# Patient Record
Sex: FEMALE | Race: OTHER | Employment: FULL TIME | URBAN - METROPOLITAN AREA
[De-identification: names, ages, dates, MRNs, and addresses within clinical notes are randomized per-mention and may not be internally consistent; named-entity substitution may affect disease eponyms.]

---

## 2022-05-12 ENCOUNTER — TRANSCRIBE ORDER (OUTPATIENT)
Dept: SCHEDULING | Age: 27
End: 2022-05-12

## 2022-05-12 DIAGNOSIS — Z98.890 OTHER SPECIFIED POSTPROCEDURAL STATES: Primary | ICD-10-CM

## 2022-06-08 ENCOUNTER — HOSPITAL ENCOUNTER (OUTPATIENT)
Dept: NON INVASIVE DIAGNOSTICS | Age: 27
Discharge: HOME | End: 2022-06-08
Attending: STUDENT IN AN ORGANIZED HEALTH CARE EDUCATION/TRAINING PROGRAM
Payer: MEDICAID

## 2022-06-08 VITALS
SYSTOLIC BLOOD PRESSURE: 101 MMHG | BODY MASS INDEX: 28.34 KG/M2 | DIASTOLIC BLOOD PRESSURE: 54 MMHG | HEIGHT: 62 IN | WEIGHT: 154 LBS

## 2022-06-08 DIAGNOSIS — Z98.890 OTHER SPECIFIED POSTPROCEDURAL STATES: ICD-10-CM

## 2022-06-08 LAB
ECHO AO ROOT DIAM: 2.6 CM
ECHO AO ROOT INDEX: 1.52 CM/M2
ECHO AV MEAN GRADIENT: 4 MMHG
ECHO AV MEAN VELOCITY: 0.9 M/S
ECHO AV PEAK GRADIENT: 8 MMHG
ECHO AV PEAK VELOCITY: 1.4 M/S
ECHO AV VTI: 25.3 CM
ECHO LA DIAMETER INDEX: 1.4 CM/M2
ECHO LA DIAMETER: 2.4 CM
ECHO LA TO AORTIC ROOT RATIO: 0.92
ECHO LA VOL 4C: 20 ML (ref 22–52)
ECHO LA VOLUME INDEX A4C: 12 ML/M2 (ref 16–34)
ECHO LV E' LATERAL VELOCITY: 23 CM/S
ECHO LV E' SEPTAL VELOCITY: 12 CM/S
ECHO LV EDV A4C: 90 ML
ECHO LV EDV INDEX A4C: 53 ML/M2
ECHO LV EJECTION FRACTION A4C: 58 %
ECHO LV ESV A4C: 38 ML
ECHO LV ESV INDEX A4C: 22 ML/M2
ECHO LV FRACTIONAL SHORTENING: 30 % (ref 28–44)
ECHO LV GLOBAL LONGITUDINAL STRAIN (GLS): -19.4 %
ECHO LV INTERNAL DIMENSION DIASTOLE INDEX: 2.57 CM/M2
ECHO LV INTERNAL DIMENSION DIASTOLIC: 4.4 CM (ref 3.9–5.3)
ECHO LV INTERNAL DIMENSION SYSTOLIC INDEX: 1.81 CM/M2
ECHO LV INTERNAL DIMENSION SYSTOLIC: 3.1 CM
ECHO LV IVSD: 0.9 CM (ref 0.6–0.9)
ECHO LV MASS 2D: 118.6 G (ref 67–162)
ECHO LV MASS INDEX 2D: 69.3 G/M2 (ref 43–95)
ECHO LV POSTERIOR WALL DIASTOLIC: 0.8 CM (ref 0.6–0.9)
ECHO LV RELATIVE WALL THICKNESS RATIO: 0.36
ECHO LVOT AREA: 3.1 CM2
ECHO LVOT DIAM: 2 CM
ECHO MV A VELOCITY: 0.43 M/S
ECHO MV E DECELERATION TIME (DT): 162.7 MS
ECHO MV E VELOCITY: 0.99 M/S
ECHO MV E/A RATIO: 2.3
ECHO MV E/E' LATERAL: 4.3
ECHO MV E/E' RATIO (AVERAGED): 6.28
ECHO MV E/E' SEPTAL: 8.25
ECHO PULMONARY ARTERY END DIASTOLIC PRESSURE: 5 MMHG
ECHO PV REGURGITANT MAX VELOCITY: 1.1 M/S
ECHO RV FREE WALL PEAK S': 20 CM/S
ECHO RV INTERNAL DIMENSION: 2.7 CM
ECHO RV TAPSE: 2.1 CM (ref 1.7–?)

## 2022-06-08 PROCEDURE — 96374 THER/PROPH/DIAG INJ IV PUSH: CPT

## 2022-06-24 ENCOUNTER — HOSPITAL ENCOUNTER (EMERGENCY)
Age: 27
Discharge: HOME | End: 2022-06-24
Attending: OBSTETRICS & GYNECOLOGY | Admitting: OBSTETRICS & GYNECOLOGY
Payer: MEDICAID

## 2022-06-24 VITALS
HEART RATE: 74 BPM | RESPIRATION RATE: 16 BRPM | BODY MASS INDEX: 40.48 KG/M2 | DIASTOLIC BLOOD PRESSURE: 61 MMHG | WEIGHT: 220 LBS | TEMPERATURE: 97.9 F | SYSTOLIC BLOOD PRESSURE: 104 MMHG | HEIGHT: 62 IN

## 2022-06-24 LAB
APPEARANCE UR: CLEAR
BILIRUB UR QL: NEGATIVE
COLOR UR: YELLOW
GLUCOSE UR QL STRIP.AUTO: NEGATIVE MG/DL
KETONES UR-MCNC: ABNORMAL MG/DL
LEUKOCYTE ESTERASE UR QL STRIP: NEGATIVE
NITRITE UR QL: NEGATIVE
PH UR: 6.5 [PH] (ref 5–9)
PROT UR QL: NEGATIVE MG/DL
RBC # UR STRIP: NEGATIVE /UL
SERVICE CMNT-IMP: ABNORMAL
SP GR UR: 1.02 (ref 1–1.02)
UROBILINOGEN UR QL: 0.2 EU/DL (ref 0.2–1)

## 2022-06-24 PROCEDURE — 74011250636 HC RX REV CODE- 250/636: Performed by: MIDWIFE

## 2022-06-24 PROCEDURE — 59025 FETAL NON-STRESS TEST: CPT

## 2022-06-24 PROCEDURE — 96360 HYDRATION IV INFUSION INIT: CPT

## 2022-06-24 PROCEDURE — 81003 URINALYSIS AUTO W/O SCOPE: CPT

## 2022-06-24 PROCEDURE — 99284 EMERGENCY DEPT VISIT MOD MDM: CPT

## 2022-06-24 RX ORDER — FAMOTIDINE 20 MG/1
20 TABLET, FILM COATED ORAL 2 TIMES DAILY
COMMUNITY
End: 2022-09-03

## 2022-06-24 RX ORDER — SERTRALINE HYDROCHLORIDE 25 MG/1
TABLET, FILM COATED ORAL DAILY
COMMUNITY

## 2022-06-24 RX ADMIN — SODIUM CHLORIDE, SODIUM LACTATE, POTASSIUM CHLORIDE, AND CALCIUM CHLORIDE 1000 ML: 600; 310; 30; 20 INJECTION, SOLUTION INTRAVENOUS at 23:30

## 2022-06-24 NOTE — LETTER
6/24/2022 11:35 PM    Ms. Pedroza Ar [address] [address]     To Whom it May Concern:     Ms. Divya Jose was seen today in the L&D triage at [facility].. She is being discharged with no restrictions. If there are any questions, please feel free to call. Sincerely,        Scott Albarado.  MAX Smith

## 2022-06-25 NOTE — PROGRESS NOTES
HPI:    Subjective: here for c/o upper abdominal pain - lasting approx 3 min. Once per hour, decreased FM, light pink in mucus when wiped yesterday - none today, denies reg. Ctx or loss of fluid, denies vaginal pruritus or dysuria     Fabian Patterson, a 32 y.o.   at 30w6d presents to L&D with c/o see above. Assessment:  Past Medical History:   Diagnosis Date    Atrial septal defect     Major depressive disorder     Narcolepsy     Narcotic abuse in remission [facility])      Past Surgical History:   Procedure Laterality Date    HX TYMPANOSTOMY      HX VENTRAL SEPTAL DEFECT REPAIR         Allergies   Allergen Reactions    Papaya Hives    Rocephin [Ceftriaxone] Hives     Prior to Admission medications    Medication Sig Start Date End Date Taking? Authorizing Provider   PNV Comb #2-Iron-FA-Omega 3 29-1-400 mg cmpk Take  by mouth. Yes Provider, Historical   Iron BisGl &PS Hwn-[id number] mg-mg-mcg-mg cap Take  by mouth. Yes Provider, Historical   sertraline (Zoloft) 25 mg tablet Take  by mouth daily. Yes Provider, Historical   famotidine (Pepcid) 20 mg tablet Take 20 mg by mouth two (2) times a day. Yes Provider, Historical        Objective:     Vitals:  Vitals:    22 2211 22 2212 22 2230   BP:   104/61   Pulse:   74   Resp:   16   Temp:  97.9 °F (36.6 °C)    Weight: 99.8 kg (220 lb)     Height: 5' 2\" (1.575 m)          Physical Exam:  Patient without distress. Abdomen: soft, nontender  Fundus: soft and non tender  Perineum: blood absent, amniotic fluid absent  Membranes:  Intact  Fetal Heart Rate: Reactive  Baseline: 120 per minute  Variability: moderate  Accelerations: yes  Decelerations: variable - intermittent  Uterine contractions: none  Uterine irritability: no  Cervical exam: Deferred  U/A: Urine dipstick shows positive for trace ketones.  S.G. 1.025    Assessment/Plan: reassuring fetal status, dehydration - resolved with IVF bolus, no e/o PTL, patient reports feeling fetal movement prior to discharge     Plan: DC home with standard & ER labor precautions. Follow-up in office for routine visit.      Signed By:  Marlin Salas CNM     June 24, 2022

## 2022-06-25 NOTE — PROGRESS NOTES
2215Magddm Cid presents to L&D triage reporting upper abdominal pain/cramping lasting about 3 minutes, once per hour today. 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 Kristie Lilly also reports one episode of pink-streaked mucus discharge yesterday evening, some mucus discharge since then. No LOF or vaginal bleeding noted. Reports decreased fetal movement today, discussed changes in characteristics of movement as fetus grows. Now feeling movement. 2230: Per ARTI Arshad, give 1L IV fluid bolus at this time. 2330: Verena reports feeling better at this time. Per ARTI Arshad, ok to d/c home. Given note for work that she is being d/c with no restrictions. Discharge instructions given, instructed to increase fluid intake and rest as needed. F/U with OB.

## 2022-07-01 ENCOUNTER — HOSPITAL ENCOUNTER (OUTPATIENT)
Dept: PHYSICAL THERAPY | Age: 27
Discharge: HOME | End: 2022-07-01
Payer: MEDICAID

## 2022-07-01 PROCEDURE — 97530 THERAPEUTIC ACTIVITIES: CPT

## 2022-07-01 PROCEDURE — 97110 THERAPEUTIC EXERCISES: CPT

## 2022-07-01 PROCEDURE — 97112 NEUROMUSCULAR REEDUCATION: CPT

## 2022-07-01 NOTE — PROGRESS NOTES
PF DAILY TREATMENT NOTE    Patient Name: Lynn Garvey  Date:2022  : 1995  [x]  Patient  Verified  Payor: [facility] MEDICAID / Plan: [address] [facility] / Product Type: Managed Care Medicaid /    In time:9:36  Out time:10:16  Total Treatment Time (min): 40    Visit #: 2 of 8    Treatment Area: Other symptoms and signs involving the genitourinary system [R39.89]    SUBJECTIVE  Pain Level (0-10 scale): 0/10  Any medication changes, allergies to medications, adverse drug reactions, diagnosis change, or new procedure performed?: [x] No    [] Yes (see summary sheet for update)  Subjective functional status/changes:   [x] No changes reported  Patient states that she has been working on her kegels. She had some pain with her rotation exercises but thinks it is due to baby placement. OBJECTIVE    15 min Therapeutic Exercise:  [x] See flow sheet :   [x]  Pelvic floor strengthening                 []  Pelvic floor downtraining  []  Quality pelvic floor contractions       []  Relaxation techniques  []  Urge suppression exercises  [x]  Other: gluteal, adductor, and postural strengthening   Rationale: increase ROM, increase strength, improve coordination, improve balance and increase proprioception  to improve the patients ability to return to PLOF. 10 min Therapeutic Activity:  [x]  See flow sheet :    []  Increase Tissue extensibility        []  Assess fiber intake    []  Assess voiding habits  []  Assess bowel habits  []  Other:   Rationale: increase ROM, increase strength, improve coordination, improve balance and increase proprioception  to improve the patients ability to reduce pain with transitional movements.        15 min Neuromuscular Re-education:  [x]  See flow sheet :   [x]  Pelvic floor strengthening                 []  Pelvic floor downtraining  [x]  Quality pelvic floor contractions       []  Relaxation techniques  []  Urge suppression exercises  [x]  Other: abdominal activation  Rationale: increase ROM, increase strength, improve coordination, improve balance and increase proprioception  to improve the patients ability to activate abdominals with functional ADLs. With   [x] TE   [x] TA   [] neuro  [] manual  [] self care   [] other: Patient Education: [x] Review HEP    [] Progressed/Changed HEP based on:   [] positioning   [] body mechanics   [x] transfers   [] heat/ice application    [] other:      Pain Level (0-10 scale) post treatment: 2/10    ASSESSMENT/Changes in Function: Patient did well with today's session. She tolerated addition of TrA march, sidelying clams, hip adduction, and cat/cow for abdominal and pelvic stability. Patient does demonstrate abdominal pain with transitional movements. Reviewed use of transverse abdominis activation with exhale during transitional movements to reduce pain. Patient will continue to benefit from skilled PT services to modify and progress therapeutic interventions, address functional mobility deficits, address ROM deficits, address strength deficits, analyze and address soft tissue restrictions, analyze and cue movement patterns, analyze and modify body mechanics/ergonomics, assess and modify postural abnormalities, address imbalance/dizziness and instruct in home and community integration to attain remaining goals. [x]  See Plan of Care  []  See progress note/recertification  []  See Discharge Summary         Progress towards goals / Updated goals:  Short term goals: To be achieved in 4 weeks:  Patient will demonstrate proper dietary/fluid habits and urge suppression strategies that promote bladder and bowel health to aid in management of urinary urgency and incontinence. Status at eval: Patient consuming insufficient amount of water      Patient will report improvement in UI complaints evidence by a decrease in pad usage and/or amount of leakage by 50% to improve QOL.   Status at eval: Patient using 1-2 pads (pantiliners) per day, generally drop or two (amount of leakage).      Patient will report being able to stand for 2 hours with no more than 4/10 pain  Status at eval:  6/10 pain with standing for more than 2 hours  /Current: 3-4/10 pain after standing for more than 2 hours 7/1/22     Long term goals: To be achieved in 8 weeks:  Patient will report bladder continence 75% of the time with cough/sneeze/laugh and walking to the toilet.   Status at eval: patient stress and urgency incontinence 1 times  per week     Patient will report being able to stand for 2 hours with no more than 2/10 pain  Status at eval:  6/10 pain with standing for more than 2 hours     Patient will demonstrate improvement of current complaints evidenced by reporting \"little difficulty' with household chores due to her pain  Status at Eval: Patient reported \"moderate difficulty\" with household chores     Patient will demonstrate independence with management tools and exercise program that are beneficial for current condition in order to feel comfortable with Pelvic floor PT D/C and not fear exacerbation of current condition or symptoms returning.    Status at eval : patient is unaware of what exercises to do to lessen her symptoms and unaware of what activities to avoid to avoid exacerbation of current condition       PLAN  []  Upgrade activities as tolerated     [x]  Continue plan of care  []  Update interventions per flow sheet       []  Discharge due to:_  []  Other:_      Alli Proctor PT 7/1/2022  8:18 AM    Future Appointments   Date Time Provider Chika Funez   7/1/2022  9:30 AM Tj Combs [facility] [address]   7/6/2022  2:45 PM Tj Combs [facility]   7/13/2022  1:15 PM Perez Daly [facility]   7/20/2022  1:15 PM Perez Daly, [facility]   7/27/2022  2:00 PM Perez Daly PT [facility]

## 2022-07-06 ENCOUNTER — HOSPITAL ENCOUNTER (OUTPATIENT)
Dept: PHYSICAL THERAPY | Age: 27
Discharge: HOME | End: 2022-07-06
Payer: MEDICAID

## 2022-07-06 PROCEDURE — 97530 THERAPEUTIC ACTIVITIES: CPT

## 2022-07-06 PROCEDURE — 97112 NEUROMUSCULAR REEDUCATION: CPT

## 2022-07-06 PROCEDURE — 97110 THERAPEUTIC EXERCISES: CPT

## 2022-07-06 NOTE — PROGRESS NOTES
PF DAILY TREATMENT NOTE    Patient Name: Octavia Ayala  Date:2022  : 1995  [x]  Patient  Verified  Payor: [facility] MEDICAID / Plan: [facility] / Product Type: Managed Care Medicaid /    In time:2:50  Out time:3:28  Total Treatment Time (min): 38    For [facility]/[employer] only  Total Timed Codes (min): 38  Of Timed Code minutes, 1:1 Treatment Time: 38     Visit #: 3 of 8    Treatment Area: Other symptoms and signs involving the genitourinary system [R39.89]    SUBJECTIVE  Pain Level (0-10 scale): 0/10  Any medication changes, allergies to medications, adverse drug reactions, diagnosis change, or new procedure performed?: [x] No    [] Yes (see summary sheet for update)  Subjective functional status/changes:   [x] No changes reported  Patient states she has figured out a routine for her exercises. She states that pelvic pain and pressure has been improving. OBJECTIVE    20 min Therapeutic Exercise:  [x] See flow sheet :   [x]  Pelvic floor strengthening                 []  Pelvic floor downtraining  [x]  Quality pelvic floor contractions       []  Relaxation techniques  []  Urge suppression exercises  []  Other:  Rationale: increase ROM, increase strength, improve coordination, improve balance and increase proprioception  to improve the patients ability to restore PLOF. 8 min Therapeutic Activity:  [x]  See flow sheet :    []  Increase Tissue extensibility        []  Assess fiber intake    []  Assess voiding habits  []  Assess bowel habits  []  Other:   Rationale: increase ROM, increase strength, improve coordination, improve balance and increase proprioception  to improve the patients ability to complete functional activities.      10 min Neuromuscular Re-education:  [x]  See flow sheet :   [x]  Pelvic floor strengthening                 []  Pelvic floor downtraining  [x]  Quality pelvic floor contractions       []  Relaxation techniques  []  Urge suppression exercises  [] Other:  Rationale: increase ROM, increase strength, improve coordination, improve balance and increase proprioception  to improve the patients ability to activate abdominals and pelvic floor without compensation to return PLOF. With   [] TE   [] TA   [] neuro  [] manual  [] self care   [] other: Patient Education: [x] Review HEP    [] Progressed/Changed HEP based on:   [] positioning   [] body mechanics   [] transfers   [] heat/ice application    [] other:      Other Objective/Functional Measures: see goals     Pain Level (0-10 scale) post treatment: 0/10    ASSESSMENT/Changes in Function: Patient tolerated treatment session well. Patient given updated HEP with TA and glute exercises. Patient exhibited improved mechanics with exercises resulting in less pelvic and abdominal pain. Patient demonstrated competency with log rolling as well. [x]  Decrease # of leaks   [x] No change []  Improving [] Resolved     [x]  Decrease pain [] No change [x]  Improving [] Resolved       Patient will continue to benefit from skilled PT services to modify and progress therapeutic interventions, address functional mobility deficits, address ROM deficits, address strength deficits, analyze and address soft tissue restrictions, analyze and cue movement patterns, analyze and modify body mechanics/ergonomics, assess and modify postural abnormalities, address imbalance/dizziness and instruct in home and community integration to attain remaining goals. [x]  See Plan of Care  []  See progress note/recertification  []  See Discharge Summary         Progress towards goals / Updated goals:  Short term goals: To be achieved in 4 weeks:  Patient will demonstrate proper dietary/fluid habits and urge suppression strategies that promote bladder and bowel health to aid in management of urinary urgency and incontinence.   Status at eval: Patient consuming insufficient amount of water      Patient will report improvement in UI complaints evidence by a decrease in pad usage and/or amount of leakage by 50% to improve QOL. Status at eval: Patient using 1-2 pads (pantiliners) per day, generally drop or two (amount of leakage).      Patient will report being able to stand for 2 hours with no more than 4/10 pain  Status at eval:  6/10 pain with standing for more than 2 hours  Current: 3-4/10 pain after standing for more than 2 hours 7/1/22     Long term goals: To be achieved in 8 weeks:  Patient will report bladder continence 75% of the time with cough/sneeze/laugh and walking to the toilet.   Status at eval: patient stress and urgency incontinence 1 times per week     Patient Mattie Grumbling being able to stand for 2 hours with no more than 2/10 pain  Status at eval:  6/10 pain with standing for more than 2 hours  Current: 3/10 pain with standing for more than 2 hours; progressing 7/6/22     Patient will demonstrate improvement of current complaints evidenced by reporting \"little difficulty' with household chores due to her pain  Status at Eval: Patient reported \"moderate difficulty\" with household chores     Patient will demonstrate independence with management tools and exercise program that are beneficial for current condition in order to feel comfortable with Pelvic floor PT D/C and not fear exacerbation of current condition or symptoms returning.    Status at eval : patient is unaware of what exercises to do to lessen her symptoms and unaware of what activities to avoid to avoid exacerbation of current condition    PLAN  []  Upgrade activities as tolerated     [x]  Continue plan of care  []  Update interventions per flow sheet       []  Discharge due to:_  []  Other:_      Jojo Turner, PT 7/6/2022  8:25 AM    Future Appointments   Date Time Provider Chika Funez   7/6/2022  2:45 PM Selam Martinez, [address] [facility]   7/13/2022  1:15 PM Ranjeet Rincon [address] [facility]   7/20/2022  1:15 PM Ranjeet Rincon [address] [facility]   7/27/2022  2:00 PM Ranjeet Rincon, PT [facility]

## 2022-07-07 ENCOUNTER — HOSPITAL ENCOUNTER (OUTPATIENT)
Age: 27
Discharge: HOME | End: 2022-07-07
Attending: STUDENT IN AN ORGANIZED HEALTH CARE EDUCATION/TRAINING PROGRAM | Admitting: STUDENT IN AN ORGANIZED HEALTH CARE EDUCATION/TRAINING PROGRAM
Payer: MEDICAID

## 2022-07-07 VITALS
HEART RATE: 79 BPM | WEIGHT: 225 LBS | SYSTOLIC BLOOD PRESSURE: 89 MMHG | DIASTOLIC BLOOD PRESSURE: 47 MMHG | RESPIRATION RATE: 18 BRPM | BODY MASS INDEX: 41.41 KG/M2 | TEMPERATURE: 98.1 F | HEIGHT: 62 IN

## 2022-07-07 PROBLEM — O26.899 VAGINAL DISCHARGE DURING PREGNANCY: Status: ACTIVE | Noted: 2022-07-07

## 2022-07-07 PROBLEM — Z33.1 IUP (INTRAUTERINE PREGNANCY), INCIDENTAL: Status: ACTIVE | Noted: 2022-07-07

## 2022-07-07 PROBLEM — N89.8 VAGINAL DISCHARGE DURING PREGNANCY: Status: ACTIVE | Noted: 2022-07-07

## 2022-07-07 PROBLEM — Z3A.32 32 WEEKS GESTATION OF PREGNANCY: Status: ACTIVE | Noted: 2022-07-07

## 2022-07-07 LAB
APPEARANCE UR: CLEAR
BILIRUB UR QL: NEGATIVE
COLOR UR: YELLOW
GLUCOSE UR STRIP.AUTO-MCNC: NEGATIVE MG/DL
HGB UR QL STRIP: NEGATIVE
KETONES UR QL STRIP.AUTO: NEGATIVE MG/DL
LEUKOCYTE ESTERASE UR QL STRIP.AUTO: NEGATIVE
NITRITE UR QL STRIP.AUTO: NEGATIVE
PH UR STRIP: 6.5 [PH] (ref 5–8)
PROT UR STRIP-MCNC: NEGATIVE MG/DL
SP GR UR REFRACTOMETRY: 1.01 (ref 1–1.03)
UROBILINOGEN UR QL STRIP.AUTO: 0.2 EU/DL (ref 0.2–1)

## 2022-07-07 PROCEDURE — 87491 CHLMYD TRACH DNA AMP PROBE: CPT

## 2022-07-07 PROCEDURE — 99284 EMERGENCY DEPT VISIT MOD MDM: CPT

## 2022-07-07 PROCEDURE — 87661 TRICHOMONAS VAGINALIS AMPLIF: CPT

## 2022-07-07 PROCEDURE — 59025 FETAL NON-STRESS TEST: CPT

## 2022-07-07 PROCEDURE — 81003 URINALYSIS AUTO W/O SCOPE: CPT

## 2022-07-08 LAB
C TRACH RRNA SPEC QL NAA+PROBE: NEGATIVE
N GONORRHOEA RRNA SPEC QL NAA+PROBE: NEGATIVE
SPECIMEN SOURCE: NORMAL

## 2022-07-08 NOTE — PROGRESS NOTES
Patient is a  at 32.5 weeks who presents to unit for concerns of abnormal discharge noted this afternoon when using the restroom. Patient denies s/sx of UTI.      Dr. Miky Moody on unit. MD made aware of patient and concerns. EFM reviewed. Patient PO hydrating in order to void. Orders received.  Dr. Miky Moody reviewed  [facility]. Discharge orders received.  discharge instructions reviewed. Patient denies questions or concerns.  Patient ambulates of unit at this time.

## 2022-07-08 NOTE — DISCHARGE INSTRUCTIONS
Patient Education   Patient Education   Patient Education        Weeks 32 to 29 of Your Pregnancy: Care Instructions  Overview     During the last few weeks of your pregnancy, you may have more aches and pains. It's important to rest when you can. Your growing baby is putting more pressure on your bladder. So you may need to urinate more often. Hemorrhoids are also common. These are painful, itchy veins in the rectal area. You may want to talk with your doctor about banking your baby's umbilical cord blood. This is the blood left in the cord after birth. If you want to save this blood, you must arrange it ahead of time. You can't decide at the last minute. If you haven't already had the Tdap shot during this pregnancy, talk to your doctor about getting it. It will help protect your  against pertussis infection. Follow-up care is a key part of your treatment and safety. Be sure to make and go to all appointments, and call your doctor if you are having problems. It's also a good idea to know your test results and keep a list of the medicines you take. How can you care for yourself at home? Ease hemorrhoids  · Get more liquids, fruits, vegetables, and fiber in your diet. This will help keep your stools soft. · Avoid sitting for too long. Lie on your left side several times a day. · Clean yourself with soft, moist toilet paper. Or you can use witch hazel pads or personal hygiene pads. · If you are uncomfortable, try ice packs. Or you can sit in a warm sitz bath. Do these for 20 minutes at a time, as needed. · Use hydrocortisone cream for pain and itching. Two examples are Anusol and Preparation H Hydrocortisone. · Ask your doctor about taking an over-the-counter stool softener. Consider breastfeeding  · Experts recommend breastfeeding for 1 year or longer. · Breast milk may help protect your child from some health problems.   babies are less likely than formula-fed babies to:  ? Get ear infections, colds, diarrhea, and pneumonia. ? Be obese or get diabetes later in life. · Breastfeeding causes the release of a hormone called oxytocin. This hormone may help your uterus shrink back faster. · Breastfeeding may help you lose weight faster. Making milk burns calories. · Breastfeeding can lower your risk of breast cancer, ovarian cancer, and osteoporosis. Decide about circumcision for your baby  · As you make this decision, it may help to think about your personal, [employer]ism, and family traditions. You get to decide if you will keep your baby's penis natural or if your baby will be circumcised. · If you decide that you would like to have your baby circumcised, talk with your doctor. You can share your concerns about pain. And you can discuss your preferences for anesthesia. Where can you learn more? Go to http://www.handley.com/  Enter X711 in the search box to learn more about \"Weeks 32 to 34 of Your Pregnancy: Care Instructions. \"  Current as of: June 16, 2021               Content Version: 13.2  © 2006-2022 [email or web address]. Care instructions adapted under license by [employer] (which disclaims liability or warranty for this information). If you have questions about a medical condition or this instruction, always ask your healthcare professional. [place] [address] any warranty or liability for your use of this information. Counting Your Baby's Kicks: Care Instructions  Overview     Counting your baby's kicks is one way your doctor can tell that your baby is healthy. Most women--especially in a first pregnancy--feel their baby move for the first time between 16 and 22 weeks. The movement may feel like flutters rather than kicks. Your baby may move more at certain times of the day. When you are active, you may notice less kicking than when you are resting.  At your prenatal visits, your doctor will ask whether the baby is active. In your last trimester, your doctor may ask you to count the number of times you feel your baby move. Follow-up care is a key part of your treatment and safety. Be sure to make and go to all appointments, and call your doctor if you are having problems. It's also a good idea to know your test results and keep a list of the medicines you take. How do you count fetal kicks? · A common method of checking your baby's movement is to note the length of time it takes to count ten movements (such as kicks, flutters, or rolls). · Pick your baby's most active time of day to count. This may be any time from morning to evening. · If you don't feel 10 movements in an hour, have something to eat or drink and count for another hour. If you don't feel at least 10 movements in the 2-hour period, call your doctor. When should you call for help? Call your doctor now or seek immediate medical care if:    · You noticed that your baby has stopped moving or is moving much less than normal.   Watch closely for changes in your health, and be sure to contact your doctor if you have any problems. Where can you learn more? Go to http://www.gray.com/  Enter [id number] in the search box to learn more about \"Counting Your Baby's Kicks: Care Instructions. \"  Current as of: June 16, 2021               Content Version: 13.2  © 4470-3407 [employer]. Care instructions adapted under license by [employer] (which disclaims liability or warranty for this information). If you have questions about a medical condition or this instruction, always ask your healthcare professional. [place] [address] any warranty or liability for your use of this information. Learning About When to Call Your Doctor During Pregnancy (After 20 Weeks)  Overview  It's common to have concerns about what might be a problem when you're pregnant. Most pregnancies don't have any serious problems. But it's still important to know when to call your doctor if you have certain symptoms or signs of labor. These are general suggestions. Your doctor may give you some more information about when to call. When to call your doctor (after 20 weeks)  Call 911  anytime you think you may need emergency care. For example, call if:  · You have severe vaginal bleeding. · You have sudden, severe pain in your belly. · You passed out (lost consciousness). · You have a seizure. · You see or feel the umbilical cord. · You think you are about to deliver your baby and can't make it safely to the hospital.  Call your doctor now or seek immediate medical care if:  · You have vaginal bleeding. · You have belly pain. · You have a fever. · You have symptoms of preeclampsia, such as:  ? Sudden swelling of your face, hands, or feet. ? New vision problems (such as dimness, blurring, or seeing spots). ? A severe headache. · You have a sudden release of fluid from your vagina. (You think your water broke.)  · You think that you may be in labor. This means that you've had at least 6 contractions in an hour. · You notice that your baby has stopped moving or is moving much less than normal.  · You have symptoms of a urinary tract infection. These may include:  ? Pain or burning when you urinate. ? A frequent need to urinate without being able to pass much urine. ? Pain in the flank, which is just below the rib cage and above the waist on either side of the back. ? Blood in your urine. Watch closely for changes in your health, and be sure to contact your doctor if:  · You have vaginal discharge that smells bad. · You have skin changes, such as:  ? A rash. ? Itching. ? Yellow color to your skin. · You have other concerns about your pregnancy. If you have labor signs at 37 weeks or more  If you have signs of labor at 37 weeks or more, your doctor may tell you to call when your labor becomes more active.  Symptoms of active labor include:  · Contractions that are regular. · Contractions that are less than 5 minutes apart. · Contractions that are hard to talk through. Follow-up care is a key part of your treatment and safety. Be sure to make and go to all appointments, and call your doctor if you are having problems. It's also a good idea to know your test results and keep a list of the medicines you take. Where can you learn more? Go to http://www.gray.com/  Enter N531 in the search box to learn more about \"Learning About When to Call Your Doctor During Pregnancy (After 20 Weeks). \"  Current as of: June 16, 2021               Content Version: 13.2  © 1973-8251 [employer]. Care instructions adapted under license by [employer] (which disclaims liability or warranty for this information). If you have questions about a medical condition or this instruction, always ask your healthcare professional. [address] any warranty or liability for your use of this information.

## 2022-07-13 ENCOUNTER — APPOINTMENT (OUTPATIENT)
Dept: PHYSICAL THERAPY | Age: 27
End: 2022-07-13
Payer: MEDICAID

## 2022-07-13 ENCOUNTER — TELEPHONE (OUTPATIENT)
Dept: PHYSICAL THERAPY | Age: 27
End: 2022-07-13

## 2022-07-13 LAB
SPECIMEN SOURCE: NORMAL
T VAGINALIS RRNA SPEC QL NAA+PROBE: NEGATIVE

## 2022-07-20 ENCOUNTER — APPOINTMENT (OUTPATIENT)
Dept: PHYSICAL THERAPY | Age: 27
End: 2022-07-20
Payer: MEDICAID

## 2022-07-27 ENCOUNTER — APPOINTMENT (OUTPATIENT)
Dept: PHYSICAL THERAPY | Age: 27
End: 2022-07-27
Payer: MEDICAID

## 2022-07-27 ENCOUNTER — TELEPHONE (OUTPATIENT)
Dept: PHYSICAL THERAPY | Age: 27
End: 2022-07-27

## 2022-08-05 ENCOUNTER — HOSPITAL ENCOUNTER (EMERGENCY)
Age: 27
Discharge: HOME | End: 2022-08-05
Attending: STUDENT IN AN ORGANIZED HEALTH CARE EDUCATION/TRAINING PROGRAM | Admitting: STUDENT IN AN ORGANIZED HEALTH CARE EDUCATION/TRAINING PROGRAM
Payer: MEDICAID

## 2022-08-05 VITALS
HEIGHT: 62 IN | TEMPERATURE: 98.3 F | WEIGHT: 240 LBS | RESPIRATION RATE: 20 BRPM | HEART RATE: 98 BPM | BODY MASS INDEX: 44.16 KG/M2

## 2022-08-05 PROCEDURE — 59025 FETAL NON-STRESS TEST: CPT

## 2022-08-05 NOTE — DISCHARGE INSTRUCTIONS
Counting Your Baby's Kicks: Care Instructions  Overview     Counting your baby's kicks is one way your doctor can tell that your baby is healthy. Most women--especially in a first pregnancy--feel their baby move for the first time between 16 and 22 weeks. The movement may feel like flutters rather than kicks. Your baby may move more at certain times of the day. When you are active, you may notice less kicking than when you are resting. At your prenatal visits, your doctor will ask whether the baby is active. In your last trimester, your doctor may ask you to count the number of times you feel your baby move. Follow-up care is a key part of your treatment and safety. Be sure to make and go to all appointments, and call your doctor if you are having problems. It's also a good idea to know your test results and keep a list of the medicines you take. How do you count fetal kicks? A common method of checking your baby's movement is to note the length of time it takes to count ten movements (such as kicks, flutters, or rolls). Pick your baby's most active time of day to count. This may be any time from morning to evening. If you don't feel 10 movements in an hour, have something to eat or drink and count for another hour. If you don't feel at least 10 movements in the 2-hour period, call your doctor. When should you call for help? Call your doctor now or seek immediate medical care if:    You noticed that your baby has stopped moving or is moving much less than normal.   Watch closely for changes in your health, and be sure to contact your doctor if you have any problems. Where can you learn more? Go to http://www.gray.com/  Enter [id number] in the search box to learn more about \"Counting Your Baby's Kicks: Care Instructions. \"  Current as of: June 16, 2021               Content Version: 13.2  © 6110-9054 [employer].    Care instructions adapted under license by [facility] (which disclaims liability or warranty for this information). If you have questions about a medical condition or this instruction, always ask your healthcare professional. [address] any warranty or liability for your use of this information. Pregnancy Precautions: Care Instructions  Your Care Instructions     There is no sure way to prevent labor before your due date ( labor) or to prevent most other pregnancy problems. But there are things you can do to increase your chances of a healthy pregnancy. Go to your appointments, follow your doctor's advice, and take good care of yourself. Eat well, and exercise (if your doctor agrees). And make sure to drink plenty of water. Follow-up care is a key part of your treatment and safety. Be sure to make and go to all appointments, and call your doctor if you are having problems. It's also a good idea to know your test results and keep a list of the medicines you take. How can you care for yourself at home? Make sure you go to your prenatal appointments. At each visit, your doctor will check your blood pressure. Your doctor will also check to see if you have protein in your urine. High blood pressure and protein in urine are signs of preeclampsia. This condition can be dangerous for you and your baby. Drink plenty of fluids. Dehydration can cause contractions. If you have kidney, heart, or liver disease and have to limit fluids, talk with your doctor before you increase the amount of fluids you drink. Tell your doctor right away if you notice any symptoms of an infection, such as:  Burning when you urinate. A foul-smelling discharge from your vagina. Vaginal itching. Unexplained fever. Unusual pain or soreness in your uterus or lower belly. Eat a balanced diet. Include plenty of foods that are high in calcium and iron. Foods high in calcium include milk, cheese, yogurt, almonds, and broccoli.   Foods high in iron include red meat, shellfish, poultry, eggs, beans, raisins, whole-grain bread, and leafy green vegetables. Do not smoke. If you need help quitting, talk to your doctor about stop-smoking programs and medicines. These can increase your chances of quitting for good. Do not drink alcohol or use marijuana or illegal drugs. Follow your doctor's directions about activity. Your doctor will let you know how much, if any, exercise you can do. Ask your doctor if you can have sex. If you are at risk for early labor, your doctor may ask you to not have sex. Take care to prevent falls. During pregnancy, your joints are loose, and your balance is off. Sports such as bicycling, skiing, or in-line skating can increase your risk of falling. And don't ride horses or motorcycles, dive, water ski, scuba dive, or parachute jump while you are pregnant. Avoid things that can make your body too hot and may be harmful to your baby, such as a hot tub or sauna. Or talk with your doctor before doing anything that raises your body temperature. Your doctor can tell you if it's safe. Do not take any over-the-counter or herbal medicines or supplements without talking to your doctor or pharmacist first.  When should you call for help? Call 911  anytime you think you may need emergency care. For example, call if:    You passed out (lost consciousness). You have a seizure. You have severe vaginal bleeding. You have severe pain in your belly or pelvis. You have had fluid gushing or leaking from your vagina and you know or think the umbilical cord is bulging into your vagina. If this happens, immediately get down on your knees so your rear end (buttocks) is higher than your head. This will decrease the pressure on the cord until help arrives. Call your doctor now or seek immediate medical care if:    You have signs of preeclampsia, such as:  Sudden swelling of your face, hands, or feet.   New vision problems (such as dimness, blurring, or seeing spots). A severe headache. You have any vaginal bleeding. You have belly pain or cramping. You have a fever. You have had regular contractions (with or without pain) for an hour. This means that you have 8 or more within 1 hour or 4 or more in 20 minutes after you change your position and drink fluids. You have a sudden release of fluid from your vagina. You have low back pain or pelvic pressure that does not go away. You notice that your baby has stopped moving or is moving much less than normal.   Watch closely for changes in your health, and be sure to contact your doctor if you have any problems. Where can you learn more? Go to http://www.handley.com/  Enter Y951 in the search box to learn more about \"Pregnancy Precautions: Care Instructions. \"  Current as of: June 16, 2021               Content Version: 13.2  © 7988-5469 [employer]. Care instructions adapted under license by [employer] (which disclaims liability or warranty for this information). If you have questions about a medical condition or this instruction, always ask your healthcare professional. [address] any warranty or liability for your use of this information.

## 2022-08-06 NOTE — PROGRESS NOTES
Patient comes to the unit at [phone] with complaints of decrease fetal movement. Denton Addison notified of patients complaint. 2000- Patient d/c from charge nurse. Reactive strip verfied with MARIAH.

## 2022-08-15 ENCOUNTER — TELEPHONE (OUTPATIENT)
Dept: PHYSICAL THERAPY | Age: 27
End: 2022-08-15

## 2022-08-29 ENCOUNTER — HOSPITAL ENCOUNTER (EMERGENCY)
Age: 27
Discharge: HOME | DRG: 540 | End: 2022-08-29
Attending: STUDENT IN AN ORGANIZED HEALTH CARE EDUCATION/TRAINING PROGRAM | Admitting: STUDENT IN AN ORGANIZED HEALTH CARE EDUCATION/TRAINING PROGRAM
Payer: MEDICAID

## 2022-08-29 VITALS — HEIGHT: 62 IN | BODY MASS INDEX: 44.16 KG/M2 | WEIGHT: 240 LBS

## 2022-08-29 LAB
A1 MICROGLOB PLACENTAL VAG QL: NEGATIVE
CONTROL LINE PRESENT?: NORMAL
EXPIRATION DATE: NORMAL
INTERNAL NEGATIVE CONTROL: NORMAL
KIT LOT NO.: NORMAL

## 2022-08-29 PROCEDURE — 59025 FETAL NON-STRESS TEST: CPT

## 2022-08-29 PROCEDURE — 99283 EMERGENCY DEPT VISIT LOW MDM: CPT

## 2022-08-29 PROCEDURE — 84112 EVAL AMNIOTIC FLUID PROTEIN: CPT

## 2022-08-29 PROCEDURE — 84112 EVAL AMNIOTIC FLUID PROTEIN: CPT | Performed by: OBSTETRICS & GYNECOLOGY

## 2022-08-29 NOTE — PROGRESS NOTES
1056 :  40.2 ambulated to unit with c/o LOF since last night, patient feeling baby move okay, denies headache, RUQ pain, swelling and ctx. Patient hooked up to EFM and TOCO. Nitrazine negative, amnisure negative. 1145 : discharge order received, discharge education gone over with patient, all questions and concerns addressed prior to discharge.

## 2022-09-01 ENCOUNTER — HOSPITAL ENCOUNTER (INPATIENT)
Age: 27
LOS: 2 days | Discharge: HOME | DRG: 540 | End: 2022-09-03
Attending: STUDENT IN AN ORGANIZED HEALTH CARE EDUCATION/TRAINING PROGRAM | Admitting: OBSTETRICS & GYNECOLOGY
Payer: MEDICAID

## 2022-09-01 DIAGNOSIS — Z98.891 S/P CESAREAN SECTION: Primary | ICD-10-CM

## 2022-09-01 PROBLEM — O48.0 POST TERM PREGNANCY OVER 40 WEEKS: Status: ACTIVE | Noted: 2022-09-01

## 2022-09-01 LAB
ABO + RH BLD: NORMAL
APPEARANCE UR: NORMAL
BASOPHILS # BLD: 0.1 K/UL (ref 0–0.1)
BASOPHILS NFR BLD: 1 % (ref 0–2)
BILIRUB UR QL: NEGATIVE
BLOOD GROUP ANTIBODIES SERPL: NORMAL
COLOR UR: YELLOW
DIFFERENTIAL METHOD BLD: ABNORMAL
EOSINOPHIL # BLD: 0.3 K/UL (ref 0–0.4)
EOSINOPHIL NFR BLD: 3 % (ref 0–5)
ERYTHROCYTE [DISTWIDTH] IN BLOOD BY AUTOMATED COUNT: 13.6 % (ref 11.6–14.5)
GLUCOSE UR QL STRIP.AUTO: NEGATIVE MG/DL
HCT VFR BLD AUTO: 32.2 % (ref 35–45)
HGB BLD-MCNC: 10.7 G/DL (ref 12–16)
IMM GRANULOCYTES # BLD AUTO: 0.2 K/UL (ref 0–0.04)
IMM GRANULOCYTES NFR BLD AUTO: 2 % (ref 0–0.5)
KETONES UR-MCNC: NEGATIVE MG/DL
LEUKOCYTE ESTERASE UR QL STRIP: NEGATIVE
LYMPHOCYTES # BLD: 2.3 K/UL (ref 0.9–3.6)
LYMPHOCYTES NFR BLD: 21 % (ref 21–52)
MCH RBC QN AUTO: 27.9 PG (ref 24–34)
MCHC RBC AUTO-ENTMCNC: 33.2 G/DL (ref 31–37)
MCV RBC AUTO: 84.1 FL (ref 78–100)
MONOCYTES # BLD: 0.6 K/UL (ref 0.05–1.2)
MONOCYTES NFR BLD: 6 % (ref 3–10)
NEUTS SEG # BLD: 7.4 K/UL (ref 1.8–8)
NEUTS SEG NFR BLD: 68 % (ref 40–73)
NITRITE UR QL: NEGATIVE
NRBC # BLD: 0 K/UL (ref 0–0.01)
NRBC BLD-RTO: 0 PER 100 WBC
PH UR: 7 [PH] (ref 5–9)
PLATELET # BLD AUTO: 232 K/UL (ref 135–420)
PMV BLD AUTO: 11.2 FL (ref 9.2–11.8)
PROT UR QL: NEGATIVE MG/DL
RBC # BLD AUTO: 3.83 M/UL (ref 4.2–5.3)
RBC # UR STRIP: NEGATIVE /UL
SERVICE CMNT-IMP: NORMAL
SP GR UR: 1.02 (ref 1–1.02)
SPECIMEN EXP DATE BLD: NORMAL
UROBILINOGEN UR QL: 0.2 EU/DL (ref 0.2–1)
WBC # BLD AUTO: 11 K/UL (ref 4.6–13.2)

## 2022-09-01 PROCEDURE — 65270000029 HC RM PRIVATE

## 2022-09-01 PROCEDURE — 81003 URINALYSIS AUTO W/O SCOPE: CPT

## 2022-09-01 PROCEDURE — 77030028565 HC CATH CERV RIPNG BLN COOK -B

## 2022-09-01 PROCEDURE — 85025 COMPLETE CBC W/AUTO DIFF WBC: CPT

## 2022-09-01 PROCEDURE — 74011000250 HC RX REV CODE- 250: Performed by: ADVANCED PRACTICE MIDWIFE

## 2022-09-01 PROCEDURE — 86900 BLOOD TYPING SEROLOGIC ABO: CPT

## 2022-09-01 PROCEDURE — 74011250636 HC RX REV CODE- 250/636: Performed by: OBSTETRICS & GYNECOLOGY

## 2022-09-01 PROCEDURE — 75410000002 HC LABOR FEE PER 1 HR

## 2022-09-01 PROCEDURE — 74011250637 HC RX REV CODE- 250/637: Performed by: ADVANCED PRACTICE MIDWIFE

## 2022-09-01 RX ORDER — CLINDAMYCIN PHOSPHATE 900 MG/50ML
900 INJECTION INTRAVENOUS EVERY 8 HOURS
Status: DISCONTINUED | OUTPATIENT
Start: 2022-09-01 | End: 2022-09-02

## 2022-09-01 RX ORDER — ONDANSETRON 2 MG/ML
4 INJECTION INTRAMUSCULAR; INTRAVENOUS
Status: DISCONTINUED | OUTPATIENT
Start: 2022-09-01 | End: 2022-09-02

## 2022-09-01 RX ORDER — HYDROMORPHONE HYDROCHLORIDE 1 MG/ML
1 INJECTION, SOLUTION INTRAMUSCULAR; INTRAVENOUS; SUBCUTANEOUS
Status: DISCONTINUED | OUTPATIENT
Start: 2022-09-01 | End: 2022-09-02

## 2022-09-01 RX ORDER — SERTRALINE HYDROCHLORIDE 50 MG/1
25 TABLET, FILM COATED ORAL DAILY
Status: DISCONTINUED | OUTPATIENT
Start: 2022-09-01 | End: 2022-09-04 | Stop reason: HOSPADM

## 2022-09-01 RX ORDER — MISOPROSTOL 100 UG/1
50 TABLET ORAL EVERY 4 HOURS
Status: DISCONTINUED | OUTPATIENT
Start: 2022-09-01 | End: 2022-09-02

## 2022-09-01 RX ORDER — TERBUTALINE SULFATE 1 MG/ML
0.25 INJECTION SUBCUTANEOUS
Status: DISCONTINUED | OUTPATIENT
Start: 2022-09-01 | End: 2022-09-02

## 2022-09-01 RX ORDER — OXYTOCIN/0.9 % SODIUM CHLORIDE 30/500 ML
87.3 PLASTIC BAG, INJECTION (ML) INTRAVENOUS AS NEEDED
Status: COMPLETED | OUTPATIENT
Start: 2022-09-01 | End: 2022-09-02

## 2022-09-01 RX ORDER — SODIUM CHLORIDE, SODIUM LACTATE, POTASSIUM CHLORIDE, CALCIUM CHLORIDE 600; 310; 30; 20 MG/100ML; MG/100ML; MG/100ML; MG/100ML
125 INJECTION, SOLUTION INTRAVENOUS CONTINUOUS
Status: DISCONTINUED | OUTPATIENT
Start: 2022-09-01 | End: 2022-09-02

## 2022-09-01 RX ORDER — OXYTOCIN/RINGER'S LACTATE 30/500 ML
10 PLASTIC BAG, INJECTION (ML) INTRAVENOUS AS NEEDED
Status: DISCONTINUED | OUTPATIENT
Start: 2022-09-01 | End: 2022-09-02

## 2022-09-01 RX ORDER — BUTORPHANOL TARTRATE 2 MG/ML
2 INJECTION INTRAMUSCULAR; INTRAVENOUS
Status: DISCONTINUED | OUTPATIENT
Start: 2022-09-01 | End: 2022-09-02

## 2022-09-01 RX ORDER — NALBUPHINE HYDROCHLORIDE 10 MG/ML
10 INJECTION, SOLUTION INTRAMUSCULAR; INTRAVENOUS; SUBCUTANEOUS
Status: DISCONTINUED | OUTPATIENT
Start: 2022-09-01 | End: 2022-09-02

## 2022-09-01 RX ORDER — MINERAL OIL
30 OIL (ML) ORAL AS NEEDED
Status: DISCONTINUED | OUTPATIENT
Start: 2022-09-01 | End: 2022-09-02

## 2022-09-01 RX ORDER — METHYLERGONOVINE MALEATE 0.2 MG/ML
0.2 INJECTION INTRAVENOUS AS NEEDED
Status: DISCONTINUED | OUTPATIENT
Start: 2022-09-01 | End: 2022-09-02

## 2022-09-01 RX ORDER — LIDOCAINE HYDROCHLORIDE 10 MG/ML
20 INJECTION, SOLUTION EPIDURAL; INFILTRATION; INTRACAUDAL; PERINEURAL AS NEEDED
Status: DISCONTINUED | OUTPATIENT
Start: 2022-09-01 | End: 2022-09-02

## 2022-09-01 RX ORDER — OXYTOCIN/0.9 % SODIUM CHLORIDE 30/500 ML
1-25 PLASTIC BAG, INJECTION (ML) INTRAVENOUS
Status: DISCONTINUED | OUTPATIENT
Start: 2022-09-01 | End: 2022-09-01

## 2022-09-01 RX ADMIN — Medication 2 MILLI-UNITS/MIN: at 12:40

## 2022-09-01 RX ADMIN — CLINDAMYCIN PHOSPHATE 900 MG: 900 INJECTION, SOLUTION INTRAVENOUS at 19:47

## 2022-09-01 RX ADMIN — SODIUM CHLORIDE, POTASSIUM CHLORIDE, SODIUM LACTATE AND CALCIUM CHLORIDE 125 ML/HR: 600; 310; 30; 20 INJECTION, SOLUTION INTRAVENOUS at 11:44

## 2022-09-01 RX ADMIN — SERTRALINE 25 MG: 50 TABLET, FILM COATED ORAL at 20:38

## 2022-09-01 RX ADMIN — SODIUM CHLORIDE, POTASSIUM CHLORIDE, SODIUM LACTATE AND CALCIUM CHLORIDE 125 ML/HR: 600; 310; 30; 20 INJECTION, SOLUTION INTRAVENOUS at 16:47

## 2022-09-01 RX ADMIN — MISOPROSTOL 50 MCG: 100 TABLET ORAL at 18:54

## 2022-09-01 NOTE — ROUTINE PROCESS
0742  40w5d Pt ambulated to the unit with complains of ruptured fluids, Pt taken to Triage 1, oriented to the room and call light. 4903 Dr Emily Olguin called the unit. Informed about Pt's Nitrazine test results. No further orders received. 1054 Pt ambulating to Room 3 for admission. 1100 Pt in high fowlers, US and TOCO adjusted, call bell within reach. 1135 Medic at the bedside to assist with starting IV.    1145 Pt in high fowlers, US and TOCO adjusted, call bell within reach. 1322 Pt on left lateral, US and TOCO adjusted, call bell within reach. Pt denies needs at this time. 1540 No Pit increased at this time due to contraction pattern. C/ Charo 29 at bedside, SVE:/-3. Verbal and read back orders to stop the Pit received. 1646 Pitocin stopped. 1650 Pt in high fowlers, US and TOCO adjusted, call bell within reach. 1910 Bedside and Verbal shift change report given to Luís Price RN  (oncoming nurse) by LEDY Silva  (offgoing nurse). Report included the following information SBAR, Kardex, Procedure Summary, Intake/Output, MAR, Accordion, Recent Results, and Med Rec Status.

## 2022-09-01 NOTE — H&P
History & Physical    Name: Lynn Garvey MRN: [id number]  SSN: [id number]    YOB: 1995  Age: 32 y.o. Sex: female        Subjective:     Estimated Date of Delivery: 22  OB History    Para Term  AB Living   6       5     SAB IAB Ectopic Molar Multiple Live Births   3 2              # Outcome Date GA Lbr Adrien/2nd Weight Sex Delivery Anes PTL Lv   6 Current            5 2021           4 IAB            3 IAB 2019 7w0d          2 2016           1 2014     SAB          Ms. Shelly Barreto is admitted with pregnancy at 40w5d for Presumptive ROM . Her water broke about 0715 this morning. renatal course was complicated by  LGA, narcolepsy with cataplexy, +GBS . Please see prenatal records for details. Most recent EFW was 10 lbs. Past Medical History:   Diagnosis Date    Anemia     Atrial septal defect     Epilepsy ([facility] [address]     Major depressive disorder     Narcolepsy 2005    Narcotic abuse in remission ([facility] [address]     Polycystic disease, ovaries      Past Surgical History:   Procedure Laterality Date    HX TYMPANOSTOMY      HX VENTRAL SEPTAL DEFECT REPAIR       Social History     Occupational History     Employer: [employer]   Tobacco Use    Smoking status: Former     Types: Cigarettes     Quit date:      Years since quittin.6    Smokeless tobacco: Never   Vaping Use    Vaping Use: Never used   Substance and Sexual Activity    Alcohol use: Not Currently    Drug use: Not Currently    Sexual activity: Not on file     Family History   Problem Relation Age of Onset    Heart Disease Mother     Hypertension Mother     Seizures Mother     Psychiatric Disorder Father     Autism Brother     Diabetes Maternal Grandmother        Allergies   Allergen Reactions    Bleach (Sodium Hypochlorite) Shortness of Breath    Papaya Hives    Rocephin [Ceftriaxone] Hives     Prior to Admission medications    Medication Sig Start Date End Date Taking?  Authorizing Provider   PNV Comb #2-Iron-FA-Omega 3 29-1-400 mg cmpk Take  by mouth. Yes Provider, Historical   Iron BisGl &PS Uqj-F-F04-FA-Ca 130-54-99-2 mg-mg-mcg-mg cap Take  by mouth. Yes Provider, Historical   sertraline (ZOLOFT) 25 mg tablet Take  by mouth daily. Yes Provider, Historical   famotidine (PEPCID) 20 mg tablet Take 20 mg by mouth two (2) times a day. Yes Provider, Historical        Review of Systems: Pertinent items are noted in HPI. Objective:     Vitals:  Vitals:    22 0826 22 0846   BP:  119/70   Pulse:  85   Weight: 110.7 kg (244 lb)    Height: 5' 2\" (1.575 m)         Physical Exam:  Patient without distress. Abdomen: soft, nontender, EFW 9 1/2 lbs +/-  Cervical Exam: 1 cm dilated/60/-3/vtx   Membranes:  Spontaneous Rupture of Membranes; Amniotic Fluid: clear fluid  Fetal Heart Rate: Reactive    Prenatal Labs:   No results found for: ABORH, RUBELLAEXT, GRBSEXT, HBSAGEXT, HIVEXT, RPREXT, GONNOEXT, CHLAMEXT, ABORHEXT, RUBELLAEXT, GRBSEXT, HBSAGEXT, HIVEXT, RPREXT, GONNOEXT, CHLAMEXT      Assessment/Plan:     Principal Problem:    Post term pregnancy over 40 weeks (2022)         Plan: Admit for Reassuring fetal status, Continue plan for vaginal delivery. Group B Strep was positive, will treat prophylactically with clindamycin. Will begin pitocin augmentation for PROM. Discussed risks of SD with LGA fetus, maneuvers that may need to be performed, risks of permanent neurologic injury to baby or fetal death, elective . Pt desires to proceed with labor at present. Discussed with patient, FOB and RN.

## 2022-09-01 NOTE — ACP (ADVANCE CARE PLANNING)
Advance Care Planning   Advance Care Planning Inpatient Note  [facility] Department    Today's Date: 9/1/2022  Unit: [facility] [address] [facility]    Received request from patient. Upon review of chart and communication with care team, patient's decision making abilities are not in question. Patient and her boyfriend  were present in the room during visit. Goals of ACP Conversation:  Discuss Advance Care planning documents    Health Care Decision Makers:      Primary Decision Maker: Miguelina Qureshi - Boyfriend - [phone]    Secondary Decision Maker: Beto Medina - Mother - [phone]    Summary:  Completed New Documents    Advance Care Planning Documents (Patient Wishes) on file:  Healthcare Power of /Advance Directive appointment of Health care agent  Living Will/ Advance Directive  Anatomical Gift/Organ donation     Assessment:    Patient is awaiting the arrival of her first child, son Uriel Anne. Her fiance is at the bedside. Patient is \"thinking ahead - just in case. \"     Interventions:  Provided education on documents for clarity and greater understanding  Discussed and provided education on state decision maker hierarchy  Assisted in the completion of documents according to patient's wishes at this time    Care Preferences Communicated:  No    Outcomes/Plan:  ACP Discussion Completed  New Advance Directive completed  Returned original document(s) to patient, as well as copies for distribution to appointed agents  Copy of Advance Directive given to staff to scan into medical record    [facility] on 9/1/2022 at 4:22 PM

## 2022-09-02 ENCOUNTER — ANESTHESIA (OUTPATIENT)
Dept: LABOR AND DELIVERY | Age: 27
DRG: 540 | End: 2022-09-02
Payer: MEDICAID

## 2022-09-02 ENCOUNTER — ANESTHESIA EVENT (OUTPATIENT)
Dept: LABOR AND DELIVERY | Age: 27
DRG: 540 | End: 2022-09-02
Payer: MEDICAID

## 2022-09-02 PROBLEM — O36.63X0 EXCESSIVE FETAL GROWTH AFFECTING MANAGEMENT OF PREGNANCY IN THIRD TRIMESTER: Status: ACTIVE | Noted: 2022-09-02

## 2022-09-02 PROBLEM — Z98.891 S/P CESAREAN SECTION: Status: ACTIVE | Noted: 2022-09-02

## 2022-09-02 PROBLEM — O99.013 ANEMIA DURING PREGNANCY IN THIRD TRIMESTER: Status: ACTIVE | Noted: 2022-09-02

## 2022-09-02 PROCEDURE — 88307 TISSUE EXAM BY PATHOLOGIST: CPT

## 2022-09-02 PROCEDURE — 75410000003 HC RECOV DEL/VAG/CSECN EA 0.5 HR: Performed by: OBSTETRICS & GYNECOLOGY

## 2022-09-02 PROCEDURE — 77030018809 HC RETRCTR ALXSO DISP AMR -B: Performed by: OBSTETRICS & GYNECOLOGY

## 2022-09-02 PROCEDURE — 74011250636 HC RX REV CODE- 250/636: Performed by: ADVANCED PRACTICE MIDWIFE

## 2022-09-02 PROCEDURE — 77030040830 HC CATH URETH FOL MDII -A

## 2022-09-02 PROCEDURE — 74011250636 HC RX REV CODE- 250/636: Performed by: REGISTERED NURSE

## 2022-09-02 PROCEDURE — 74011250636 HC RX REV CODE- 250/636: Performed by: STUDENT IN AN ORGANIZED HEALTH CARE EDUCATION/TRAINING PROGRAM

## 2022-09-02 PROCEDURE — 76010000391 HC C SECN FIRST 1 HR: Performed by: OBSTETRICS & GYNECOLOGY

## 2022-09-02 PROCEDURE — 2709999900 HC NON-CHARGEABLE SUPPLY: Performed by: OBSTETRICS & GYNECOLOGY

## 2022-09-02 PROCEDURE — 77030031139 HC SUT VCRL2 J&J -A: Performed by: OBSTETRICS & GYNECOLOGY

## 2022-09-02 PROCEDURE — 77030010507 HC ADH SKN DERMBND J&J -B: Performed by: OBSTETRICS & GYNECOLOGY

## 2022-09-02 PROCEDURE — 74011000258 HC RX REV CODE- 258: Performed by: ADVANCED PRACTICE MIDWIFE

## 2022-09-02 PROCEDURE — 74011250636 HC RX REV CODE- 250/636: Performed by: OBSTETRICS & GYNECOLOGY

## 2022-09-02 PROCEDURE — 74011000250 HC RX REV CODE- 250: Performed by: REGISTERED NURSE

## 2022-09-02 PROCEDURE — 76060000032 HC ANESTHESIA 0.5 TO 1 HR: Performed by: OBSTETRICS & GYNECOLOGY

## 2022-09-02 PROCEDURE — 74011000250 HC RX REV CODE- 250: Performed by: STUDENT IN AN ORGANIZED HEALTH CARE EDUCATION/TRAINING PROGRAM

## 2022-09-02 PROCEDURE — 77030011265 HC ELECTRD BLD HEX COVD -A: Performed by: OBSTETRICS & GYNECOLOGY

## 2022-09-02 PROCEDURE — 77030008459 HC STPLR SKN COOP -B: Performed by: OBSTETRICS & GYNECOLOGY

## 2022-09-02 PROCEDURE — 74011250637 HC RX REV CODE- 250/637: Performed by: ADVANCED PRACTICE MIDWIFE

## 2022-09-02 PROCEDURE — 77030014144 HC TY SPN ANES BBMI -B: Performed by: STUDENT IN AN ORGANIZED HEALTH CARE EDUCATION/TRAINING PROGRAM

## 2022-09-02 PROCEDURE — 77030040361 HC SLV COMPR DVT MDII -B: Performed by: OBSTETRICS & GYNECOLOGY

## 2022-09-02 PROCEDURE — 65270000029 HC RM PRIVATE

## 2022-09-02 RX ORDER — KETOROLAC TROMETHAMINE 30 MG/ML
30 INJECTION, SOLUTION INTRAMUSCULAR; INTRAVENOUS EVERY 6 HOURS
Status: DISCONTINUED | OUTPATIENT
Start: 2022-09-02 | End: 2022-09-03

## 2022-09-02 RX ORDER — SODIUM CHLORIDE, SODIUM LACTATE, POTASSIUM CHLORIDE, CALCIUM CHLORIDE 600; 310; 30; 20 MG/100ML; MG/100ML; MG/100ML; MG/100ML
125 INJECTION, SOLUTION INTRAVENOUS CONTINUOUS
Status: DISCONTINUED | OUTPATIENT
Start: 2022-09-02 | End: 2022-09-02 | Stop reason: HOSPADM

## 2022-09-02 RX ORDER — OXYCODONE AND ACETAMINOPHEN 5; 325 MG/1; MG/1
1-2 TABLET ORAL
Status: DISCONTINUED | OUTPATIENT
Start: 2022-09-02 | End: 2022-09-04 | Stop reason: HOSPADM

## 2022-09-02 RX ORDER — SODIUM CHLORIDE, SODIUM LACTATE, POTASSIUM CHLORIDE, CALCIUM CHLORIDE 600; 310; 30; 20 MG/100ML; MG/100ML; MG/100ML; MG/100ML
125 INJECTION, SOLUTION INTRAVENOUS CONTINUOUS
Status: DISPENSED | OUTPATIENT
Start: 2022-09-02 | End: 2022-09-03

## 2022-09-02 RX ORDER — FACIAL-BODY WIPES
10 EACH TOPICAL
Status: DISCONTINUED | OUTPATIENT
Start: 2022-09-02 | End: 2022-09-04 | Stop reason: HOSPADM

## 2022-09-02 RX ORDER — HYDROMORPHONE HYDROCHLORIDE 2 MG/ML
0.5 INJECTION, SOLUTION INTRAMUSCULAR; INTRAVENOUS; SUBCUTANEOUS
Status: ACTIVE | OUTPATIENT
Start: 2022-09-02 | End: 2022-09-03

## 2022-09-02 RX ORDER — IBUPROFEN 400 MG/1
800 TABLET ORAL
Status: DISCONTINUED | OUTPATIENT
Start: 2022-09-05 | End: 2022-09-02

## 2022-09-02 RX ORDER — HYDROMORPHONE HYDROCHLORIDE 2 MG/ML
1 INJECTION, SOLUTION INTRAMUSCULAR; INTRAVENOUS; SUBCUTANEOUS
Status: ACTIVE | OUTPATIENT
Start: 2022-09-02 | End: 2022-09-03

## 2022-09-02 RX ORDER — SODIUM CHLORIDE 0.9 % (FLUSH) 0.9 %
5-40 SYRINGE (ML) INJECTION EVERY 8 HOURS
Status: DISCONTINUED | OUTPATIENT
Start: 2022-09-02 | End: 2022-09-03

## 2022-09-02 RX ORDER — NALOXONE HYDROCHLORIDE 0.4 MG/ML
0.2 INJECTION, SOLUTION INTRAMUSCULAR; INTRAVENOUS; SUBCUTANEOUS
Status: ACTIVE | OUTPATIENT
Start: 2022-09-02 | End: 2022-09-03

## 2022-09-02 RX ORDER — OXYCODONE HYDROCHLORIDE 5 MG/1
10 TABLET ORAL
Status: ACTIVE | OUTPATIENT
Start: 2022-09-02 | End: 2022-09-03

## 2022-09-02 RX ORDER — SIMETHICONE 80 MG
80 TABLET,CHEWABLE ORAL
Status: DISCONTINUED | OUTPATIENT
Start: 2022-09-02 | End: 2022-09-04 | Stop reason: HOSPADM

## 2022-09-02 RX ORDER — OXYTOCIN/RINGER'S LACTATE 30/500 ML
10 PLASTIC BAG, INJECTION (ML) INTRAVENOUS AS NEEDED
Status: DISCONTINUED | OUTPATIENT
Start: 2022-09-02 | End: 2022-09-04 | Stop reason: HOSPADM

## 2022-09-02 RX ORDER — TRANEXAMIC ACID 10 MG/ML
INJECTION, SOLUTION INTRAVENOUS AS NEEDED
Status: DISCONTINUED | OUTPATIENT
Start: 2022-09-02 | End: 2022-09-02 | Stop reason: HOSPADM

## 2022-09-02 RX ORDER — ACETAMINOPHEN 325 MG/1
650 TABLET ORAL
Status: DISCONTINUED | OUTPATIENT
Start: 2022-09-02 | End: 2022-09-04 | Stop reason: HOSPADM

## 2022-09-02 RX ORDER — EPHEDRINE SULFATE/0.9% NACL/PF 50 MG/5 ML
SYRINGE (ML) INTRAVENOUS AS NEEDED
Status: DISCONTINUED | OUTPATIENT
Start: 2022-09-02 | End: 2022-09-02 | Stop reason: HOSPADM

## 2022-09-02 RX ORDER — OXYTOCIN/0.9 % SODIUM CHLORIDE 30/500 ML
87.3 PLASTIC BAG, INJECTION (ML) INTRAVENOUS AS NEEDED
Status: COMPLETED | OUTPATIENT
Start: 2022-09-02 | End: 2022-09-02

## 2022-09-02 RX ORDER — OXYCODONE HYDROCHLORIDE 5 MG/1
5 TABLET ORAL
Status: ACTIVE | OUTPATIENT
Start: 2022-09-02 | End: 2022-09-03

## 2022-09-02 RX ORDER — ONDANSETRON 2 MG/ML
INJECTION INTRAMUSCULAR; INTRAVENOUS AS NEEDED
Status: DISCONTINUED | OUTPATIENT
Start: 2022-09-02 | End: 2022-09-02 | Stop reason: HOSPADM

## 2022-09-02 RX ORDER — NALBUPHINE HYDROCHLORIDE 10 MG/ML
5 INJECTION, SOLUTION INTRAMUSCULAR; INTRAVENOUS; SUBCUTANEOUS
Status: ACTIVE | OUTPATIENT
Start: 2022-09-02 | End: 2022-09-03

## 2022-09-02 RX ORDER — NALOXONE HYDROCHLORIDE 0.4 MG/ML
0.4 INJECTION, SOLUTION INTRAMUSCULAR; INTRAVENOUS; SUBCUTANEOUS
Status: ACTIVE | OUTPATIENT
Start: 2022-09-02 | End: 2022-09-03

## 2022-09-02 RX ORDER — MIDAZOLAM HYDROCHLORIDE 1 MG/ML
INJECTION, SOLUTION INTRAMUSCULAR; INTRAVENOUS AS NEEDED
Status: DISCONTINUED | OUTPATIENT
Start: 2022-09-02 | End: 2022-09-02 | Stop reason: HOSPADM

## 2022-09-02 RX ORDER — KETOROLAC TROMETHAMINE 30 MG/ML
30 INJECTION, SOLUTION INTRAMUSCULAR; INTRAVENOUS
Status: DISCONTINUED | OUTPATIENT
Start: 2022-09-02 | End: 2022-09-03

## 2022-09-02 RX ORDER — ONDANSETRON 2 MG/ML
4 INJECTION INTRAMUSCULAR; INTRAVENOUS
Status: DISCONTINUED | OUTPATIENT
Start: 2022-09-02 | End: 2022-09-04 | Stop reason: HOSPADM

## 2022-09-02 RX ORDER — IBUPROFEN 400 MG/1
800 TABLET ORAL
Status: DISCONTINUED | OUTPATIENT
Start: 2022-09-02 | End: 2022-09-04 | Stop reason: HOSPADM

## 2022-09-02 RX ORDER — MORPHINE SULFATE 1 MG/ML
INJECTION, SOLUTION EPIDURAL; INTRATHECAL; INTRAVENOUS
Status: SHIPPED | OUTPATIENT
Start: 2022-09-02 | End: 2022-09-02

## 2022-09-02 RX ORDER — LANOLIN ALCOHOL/MO/W.PET/CERES
3 CREAM (GRAM) TOPICAL
Status: DISCONTINUED | OUTPATIENT
Start: 2022-09-02 | End: 2022-09-04 | Stop reason: HOSPADM

## 2022-09-02 RX ORDER — BUPIVACAINE HYDROCHLORIDE 7.5 MG/ML
INJECTION, SOLUTION EPIDURAL; RETROBULBAR
Status: SHIPPED | OUTPATIENT
Start: 2022-09-02 | End: 2022-09-02

## 2022-09-02 RX ORDER — KETOROLAC TROMETHAMINE 30 MG/ML
15 INJECTION, SOLUTION INTRAMUSCULAR; INTRAVENOUS
Status: DISCONTINUED | OUTPATIENT
Start: 2022-09-02 | End: 2022-09-03

## 2022-09-02 RX ORDER — METOCLOPRAMIDE HYDROCHLORIDE 5 MG/ML
INJECTION INTRAMUSCULAR; INTRAVENOUS AS NEEDED
Status: DISCONTINUED | OUTPATIENT
Start: 2022-09-02 | End: 2022-09-02 | Stop reason: HOSPADM

## 2022-09-02 RX ORDER — SODIUM CHLORIDE 0.9 % (FLUSH) 0.9 %
5-40 SYRINGE (ML) INJECTION AS NEEDED
Status: DISCONTINUED | OUTPATIENT
Start: 2022-09-02 | End: 2022-09-03

## 2022-09-02 RX ORDER — DEXAMETHASONE SODIUM PHOSPHATE 4 MG/ML
INJECTION, SOLUTION INTRA-ARTICULAR; INTRALESIONAL; INTRAMUSCULAR; INTRAVENOUS; SOFT TISSUE AS NEEDED
Status: DISCONTINUED | OUTPATIENT
Start: 2022-09-02 | End: 2022-09-02 | Stop reason: HOSPADM

## 2022-09-02 RX ORDER — DIPHENHYDRAMINE HYDROCHLORIDE 50 MG/ML
25 INJECTION, SOLUTION INTRAMUSCULAR; INTRAVENOUS
Status: DISCONTINUED | OUTPATIENT
Start: 2022-09-02 | End: 2022-09-04 | Stop reason: HOSPADM

## 2022-09-02 RX ORDER — PROMETHAZINE HYDROCHLORIDE 25 MG/ML
25 INJECTION, SOLUTION INTRAMUSCULAR; INTRAVENOUS
Status: DISCONTINUED | OUTPATIENT
Start: 2022-09-02 | End: 2022-09-04 | Stop reason: HOSPADM

## 2022-09-02 RX ADMIN — MIDAZOLAM 2 MG: 1 INJECTION INTRAMUSCULAR; INTRAVENOUS at 04:32

## 2022-09-02 RX ADMIN — GENTAMICIN SULFATE 250.4 MG: 40 INJECTION, SOLUTION INTRAMUSCULAR; INTRAVENOUS at 03:53

## 2022-09-02 RX ADMIN — MORPHINE SULFATE 0.2 MG: 1 INJECTION, SOLUTION EPIDURAL; INTRATHECAL; INTRAVENOUS at 03:53

## 2022-09-02 RX ADMIN — Medication 87.3 MILLI-UNITS/MIN: at 04:58

## 2022-09-02 RX ADMIN — KETOROLAC TROMETHAMINE 30 MG: 30 INJECTION, SOLUTION INTRAMUSCULAR; INTRAVENOUS at 18:00

## 2022-09-02 RX ADMIN — AZITHROMYCIN MONOHYDRATE 500 MG: 500 INJECTION, POWDER, LYOPHILIZED, FOR SOLUTION INTRAVENOUS at 04:07

## 2022-09-02 RX ADMIN — TRANEXAMIC ACID 1 G: 10 INJECTION, SOLUTION INTRAVENOUS at 04:22

## 2022-09-02 RX ADMIN — Medication 667 ML/HR: at 04:17

## 2022-09-02 RX ADMIN — ONDANSETRON HYDROCHLORIDE 4 MG: 2 INJECTION INTRAMUSCULAR; INTRAVENOUS at 04:02

## 2022-09-02 RX ADMIN — BUPIVACAINE HYDROCHLORIDE 9 MG: 7.5 INJECTION, SOLUTION EPIDURAL; RETROBULBAR at 03:53

## 2022-09-02 RX ADMIN — METOCLOPRAMIDE 10 MG: 5 INJECTION, SOLUTION INTRAMUSCULAR; INTRAVENOUS at 04:19

## 2022-09-02 RX ADMIN — SODIUM CHLORIDE, POTASSIUM CHLORIDE, SODIUM LACTATE AND CALCIUM CHLORIDE 125 ML/HR: 600; 310; 30; 20 INJECTION, SOLUTION INTRAVENOUS at 02:30

## 2022-09-02 RX ADMIN — SODIUM CHLORIDE, POTASSIUM CHLORIDE, SODIUM LACTATE AND CALCIUM CHLORIDE 125 ML/HR: 600; 310; 30; 20 INJECTION, SOLUTION INTRAVENOUS at 00:50

## 2022-09-02 RX ADMIN — DEXAMETHASONE SODIUM PHOSPHATE 4 MG: 4 INJECTION, SOLUTION INTRAMUSCULAR; INTRAVENOUS at 04:19

## 2022-09-02 RX ADMIN — SERTRALINE 25 MG: 50 TABLET, FILM COATED ORAL at 21:37

## 2022-09-02 RX ADMIN — KETOROLAC TROMETHAMINE 30 MG: 30 INJECTION, SOLUTION INTRAMUSCULAR; INTRAVENOUS at 11:58

## 2022-09-02 RX ADMIN — Medication 10 MG: at 04:17

## 2022-09-02 RX ADMIN — Medication 5 MG: at 03:57

## 2022-09-02 RX ADMIN — SODIUM CHLORIDE, POTASSIUM CHLORIDE, SODIUM LACTATE AND CALCIUM CHLORIDE 1000 ML: 600; 310; 30; 20 INJECTION, SOLUTION INTRAVENOUS at 01:30

## 2022-09-02 NOTE — ANESTHESIA PROCEDURE NOTES
Spinal Block    Start time: 9/2/2022 3:51 AM  End time: 9/2/2022 3:53 AM  Performed by: Opal John MD  Authorized by: Opal John MD     Pre-procedure: Indications: primary anesthetic  Preanesthetic Checklist: patient identified, risks and benefits discussed, anesthesia consent, site marked, patient being monitored and timeout performed    Timeout Time: 03:51 EDT      Spinal Block:   Patient Position:  Seated  Prep Region:  Lumbar  Prep: chlorhexidine      Location:  L3-4  Technique:  Single shot  Local: bupivacaine (PF) (MARCAINE) 0.75 % (7.5 mg/mL) Intrathecal - Intrathecal   9 mg - 9/2/2022 3:53:00 AM  morphine (PF) 1 mg/mL Intrathecal - Intrathecal   0.2 mg - 9/2/2022 3:53:00 AM  Local Dose (mL):  1.2  Med Admin Time: 9/2/2022 3:53 AM    Needle:   Needle Type:   Veronika  Needle Gauge:  25 G  Attempts:  1      Events: CSF confirmed, no blood with aspiration and no paresthesia        Assessment:  Insertion:  Uncomplicated  Patient tolerance:  Patient tolerated the procedure well with no immediate complications

## 2022-09-02 NOTE — PROGRESS NOTES
Labor Progress Note  Patient seen, fetal heart rate and contraction pattern evaluated, patient examined. Client states she would now like to proceed with a CS given how high the baby is and lack of cervical dilation with pit and cytotec. Visit Vitals  /75 (BP 1 Location: Left upper arm, BP Patient Position: Sitting)   Pulse 81   Temp 98.4 °F (36.9 °C)   Resp 20   Ht 5' 2\" (1.575 m)   Wt 110.7 kg (244 lb)   SpO2 100%   BMI 44.63 kg/m²           Physical Exam:  Cervical Exam:  1 cm dilated    0% effaced    -3 station    Membranes:  Spontaneous Rupture of Membranes;  Amniotic Fluid: clear fluid  Uterine Activity: irregular  Fetal Heart Rate: Reactive    Assessment/Plan:  MD Lovell notified  Hold second dose of cytotec  Plan for CS  Caro Norman, MSN, APRN, CNM  September 2, 2022

## 2022-09-02 NOTE — PROGRESS NOTES
1915: Bedside shift change report given to CARMEN Calderón RN/KI Pichardo RN (oncoming nurse) by Seven Murray RN (offgoing nurse). Report included the following information SBAR, Intake/Output, MAR, and Recent Results. 2140: Shift assessment completed. Pt. joined at bedside by baby. AAOx4. Pain 0/10. Fundus firm at U-1, scant rubra lochia. No clots noted. Educated on signs and symptoms to report and plan of care. No further questions or concerns at this time. Callbell within reach. Bed in lowest position. 2305: Patient voided 100ml. Patient states that she has not been hydrating well but plans to work on that. Patient states that she feels she is fully emptying her bladder. 0300: Needs met. Bedside shift change report given to JUNO Arthur RN (oncoming nurse) by Johnny Romero. Kylee RN (offgoing nurse). Report included the following information SBAR, Intake/Output, MAR, and Recent Results.

## 2022-09-02 NOTE — ANESTHESIA POSTPROCEDURE EVALUATION
Post-Anesthesia Evaluation and Assessment    Cardiovascular Function/Vital Signs  Visit Vitals  BP (!) 109/51 (BP 1 Location: Left upper arm, BP Patient Position: Supine)   Pulse 76   Temp 36.8 °C (98.2 °F)   Resp 22   Ht 5' 2\" (1.575 m)   Wt 110.7 kg (244 lb)   SpO2 99%   Breastfeeding Unknown   BMI 44.63 kg/m²       Patient is status post Procedure(s):   SECTION. Nausea/Vomiting: Controlled. Postoperative hydration reviewed and adequate. Pain:  Pain Scale 1: Numeric (0 - 10) (22 1543)  Pain Intensity 1: 0 (22 1543)   Managed. Neurological Status:   Neuro (WDL): Exceptions to WDL (Numbness in legs bilaterally) (22 0452)   At baseline. Mental Status and Level of Consciousness: Arousable. Pulmonary Status:   O2 Device: None (Room air) (22 0830)   Adequate oxygenation and airway patent. Complications related to anesthesia: None    Post-anesthesia assessment completed. No concerns. Patient has met all discharge requirements. Signed By: Mohan Rodriguez MD    2022               Procedure(s):   SECTION. spinal    <BSHSIANPOST>    INITIAL Post-op Vital signs:   Vitals Value Taken Time   /61 22 0646   Temp 36.9 °C (98.4 °F) 22 0646   Pulse 71 22 0646   Resp 18 22 0646   SpO2 100 % 22 0650   Vitals shown include unvalidated device data.

## 2022-09-02 NOTE — PROGRESS NOTES
Labor Progress Note  Patient seen, fetal heart rate and contraction pattern evaluated, patient examined. Patient is asking for elective . States that she no longer desires to labor. No data found. Physical Exam:  Cervical Exam:  1 cm dilated , unchanged. Membranes:  Spontaneous Rupture of Membranes; Amniotic Fluid: clear fluid  Uterine Activity: Frequency: Every 3 minutes  Fetal Heart Rate: Reactive    Assessment/Plan:  Reassuring fetal status, Proceed with  Section Reassuring fetal status. Patient desires to proceed with elective  section, premature rupture of membranes. No change in cervix despite pitocin and cytotec. Suspected LGA fetus. Risks of bleeding, infection, bladder and bowel damage explained to patient and father of baby. They understand the situation and consent to the  delivery. Or and anesthesia have been notified.

## 2022-09-02 NOTE — PROGRESS NOTES
1910- Report received; care of pt assumed by this RN    1929- Pt assessed. POC including fall prevention, safety, pain management, medications, & labor progression discussed w/ pt. Questions answered. Pt denies having any further questions/concerns at this time. 2310- MELISSA. Lucille Hoskins in to check pt & discuss POC w/ pt    2313- SVE: 1/60/-3    2320- Pt requesting c/s. CNM discussing options w/ pt.  pt given time to speak w/ significant other. Pt will notify RN & CNM when she has come to desired decision. 2350- CNM back at bedside answering pt's questions. Pt requesting c/s after all questions answered. Pt NPO at this time. 0105- SVE by Dr. Horowitz Tanner change; plan for c/s delivery. POC discussed w/ pt by Dr. Haylee Graff- Dr. Kervin Danielle at bedside discussing anesthesia POC w/ pt    0140- Pt prepped for c/s delivery; awaiting for provider availability for c/s delivery      8206- Pt ambulated w/ RN to OR for c/s delivery    [phone]- Pt returned in stable condition to L&D room w/ RN & CRNA.      9620- Viry care, catheter care, & gown change. 1942- Pt transferred in stable condition w/ baby to postpartum unit. 2167- Shift change/transfer of care report given to [facility] DAVID WU assuming care of pt.

## 2022-09-02 NOTE — PROGRESS NOTES
0745 TRANSFER - IN REPORT:    Verbal report received from Nila Larsen RN(name) on Arnaud Kaye  being received from LND(unit) for routine progression of care      Report consisted of patients Situation, Background, Assessment and   Recommendations(SBAR). Information from the following report(s) SBAR, Kardex, Intake/Output, MAR, and Recent Results was reviewed with the receiving nurse. Opportunity for questions and clarification was provided. Assessment completed upon patients arrival to unit and care assumed. 0830 Assessment completed at this time. Educated pt on infant falls prevention. Pt verbalized understanding. Educated pt on incentive spirometer, pt demonstrated use of IS.     1184 Assisted pt with breastfeeding at this time. 1100 Pt sitting in bed at this time. Pt denies further needs at this time. 1158 Toradol administered at this time. Oliver emptied, 375 mL. 65 Pt stated she felt gushes of blood. Pt firm at U-1, small bleeding. Pt bed pad and gown changed and marko care provided. Pt to order food at this time.  Pt breastfeeding at this time. 1500 Oliver removed at this time. Pt up to bathroom, tolerated well. Pt did not void at this time. Reassessment completed at this time. 1520 Pt to breastfeed at this time. 1630 Pt up to bathroom at this time to void. Pt voided 60 mL.     1800 Toradol administered at this time. 1915 Bedside and Verbal shift change report given to CARMEN Matamoros RN (oncoming nurse) by Greg Ball RN (offgoing nurse). Report included the following information SBAR, Kardex, Intake/Output, MAR, and Recent Results.

## 2022-09-02 NOTE — OP NOTES
[address] [facility] [address]                              [address] [facility] [place] [address]                               OPERATIVE REPORT    PATIENT:       Mindy Etienne    MRN:              [id number]   D ATE:            2022      PREOPERATIVE DIAGNOSES  1. Intrauterine pregnancy at term. 2. Elective primary low transverse  section. 3. LGA    POSTOPERATIVE DIAGNOSES  1. Intrauterine pregnancy at term. 2. Elective primary low transverse  section. 3.  LGA    PROCEDURE PERFORMED: Primary low-transverse  section of a viable Male  infant. Assistant: My Thomas    SURGEON: Blanca Villela MD    ANESTHESIA:  Spinal Dr. Janeen Najera BLOOD LOSS: 570 mL. SPECIMEN: Placenta. FINDINGS: A viable Male infant   delivered from the vertex presentation at 0416, Apgar's 8 and 9  and a weight of 10 lbs. 15 oz. There was clear  amniotic fluid with normal tubes and ovaries bilaterally. Charli Aspen was used to assist delivery. COMPLICATIONS: None    DESCRIPTION OF PROCEDURE:  The patient was brought to the operating room and correctly identified. The patient received 2 g of intravenous  Ancef on call for the operating room. The patient was then placed in the  sitting position and a spinal was administered without difficulty by Dr. Sera Larsen. The patient was then placed in the supine position with a roll  under her right hip. Sequential compression boots were placed on her legs  and a Oliver catheter was sterilely placed, which drained clear yellow  urine. Fetal heart tones were normal prior to the start of the procedure. The  patient was sterilely prepped and draped. The spinal was tested and noted  to be adequate for anesthesia. A time out was performed prior to making the incision. Using the scalpel, a Pfannenstiel skin incision was made 3 fingerbreadths  above the symphysis pubis.  This was carried down  sharply and bluntly to the level of the fascia, which was nicked in the  midline and extended transversely with the Bovie. The superior and then  inferior rectus fascia was grasped with Kocher's, elevated and sharply and  bluntly dissected from the rectus muscles. The muscles were pulled  laterally to expose the peritoneum, which was bluntly entered. The  incision was extended transversely with the surgeon's fingers. The Kiran O  retractor was placed into the peritoneal cavity. A bladder flap was  created with sharp and blunt dissection. The uterus was sharply incised  with a scalpel and extended superiorly and inferiorly with the surgeon's  fingers. There was clear amniotic fluid noted on entering the uterine  cavity. A viable Male was delivered from the vertex presentation at 0416  with Apgar's of 8 and 9 and a weight of 10 lbs. 15 oz. The infant was in the ROT position. Orvel Odilon was used to assist delivery with the setting at 500 mmHg for one pull. The infant was  well suctioned. The cord was clamped and the infant was given to the  nursery staff. A cord blood specimen was obtained. The placenta was  then manually extracted from the uterine cavity and the cavity was wiped  clean with a wet sponge. Intravenous Pitocin was administered. The uterus  was closed in 2 layers, the first a continuous, interlocking suture of 1 monocryl, and the second an imbricating suture of 1 monocryl. The bladder  flap was closed using a continuous running suture of 2-0 Vicryl. The ovaries and tubes were inspected and noted to be normal bilaterally and the  gutters were wiped free of blood. The Kiran O retractor was removed from  the peritoneal cavity. The fascia was closed in one length using a  continuous running suture of #1 vicryl. The subcutaneous space was closed  using 3 interrupted sutures of 2-0 plain and the skin was closed with  Insorb staples. The incision was cleaned and dried and Dermabond was placed  on the skin.     At the end of the procedure, all sharps, sponge, and instrument counts were  correct, and the Oliver was draining clear yellow urine. The patient tolerated the procedure well and she was taken to the recovery  room in stable condition.                                  Preliminary Unless Brain MD Ashley        KKO:wmx  D: 05/07/2012 12:56 P   T: 05/07/2012  1:47 P  BY:  [id number]  Job#:  [id number]  CScriptDoc #:  [id number]    cc:   Nerissa Garvey MD

## 2022-09-02 NOTE — ANESTHESIA PREPROCEDURE EVALUATION
Relevant Problems   No relevant active problems       Anesthetic History   No history of anesthetic complications     Pertinent negatives: No PONV       Review of Systems / Medical History  Patient summary reviewed, nursing notes reviewed and pertinent labs reviewed    Pulmonary  Within defined limits            Pertinent negatives: No COPD, asthma and sleep apnea     Neuro/Psych     seizures    Psychiatric history (MDD)  Pertinent negatives: No CVA   Cardiovascular                Pertinent negatives: No hypertension, past MI and CHF    Comments: ASD - Stable and not requiring intervention per cardiology 6/2022   GI/Hepatic/Renal  Within defined limits           Pertinent negatives: No liver disease and renal disease   Endo/Other        Anemia  Pertinent negatives: No diabetes   Other Findings              Physical Exam    Airway  Mallampati: II  TM Distance: 4 - 6 cm  Neck ROM: normal range of motion   Mouth opening: Normal     Cardiovascular               Dental    Dentition: Poor dentition     Pulmonary                 Abdominal  GI exam deferred       Other Findings            Anesthetic Plan    ASA: 2  Anesthesia type: spinal      Post-op pain plan if not by surgeon: intrathecal opiates      Anesthetic plan and risks discussed with: Patient

## 2022-09-02 NOTE — PROGRESS NOTES
Problem: Vaginal Delivery: Day of Deliver-Laboring  Goal: Activity/Safety  Outcome: Progressing Towards Goal  Goal: Diagnostic Test/Procedures  Outcome: Progressing Towards Goal  Goal: Nutrition/Diet  Outcome: Progressing Towards Goal  Goal: Medications  Outcome: Progressing Towards Goal  Goal: Respiratory  Outcome: Progressing Towards Goal  Goal: Treatments/Interventions/Procedures  Outcome: Progressing Towards Goal  Goal: *Vital signs within defined limits  Outcome: Progressing Towards Goal  Goal: *Labs within defined limits  Outcome: Progressing Towards Goal  Goal: *Hemodynamically stable  Outcome: Progressing Towards Goal  Goal: *Optimal pain control at patient's stated goal  Outcome: Progressing Towards Goal     Problem: Falls - Risk of  Goal: *Absence of Falls  Description: Document Nataliia Fall Risk and appropriate interventions in the flowsheet.   Outcome: Progressing Towards Goal  Note: Fall Risk Interventions:            Medication Interventions: Patient to call before getting OOB         History of Falls Interventions: Room close to nurse's station, Investigate reason for fall (Pt to call before getting out of bed)

## 2022-09-02 NOTE — PROGRESS NOTES
Post-Operative  Day 0    [facility]     Assessment:   Hospital Problems  Date Reviewed: 2022            Codes Class Noted POA    Excessive fetal growth affecting management of pregnancy in third trimester ICD-10-CM: O36.63X0  ICD-9-CM: 656.63  2022 Yes        Anemia during pregnancy in third trimester ICD-10-CM: O99.013  ICD-9-CM: 648.23  2022 Yes        S/P  section ICD-10-CM: R81.511  ICD-9-CM: V45.89  2022 No        * (Principal) Post term pregnancy over 40 weeks ICD-10-CM: O48.0  ICD-9-CM: 645.10  2022 Yes        Narcolepsy ICD-10-CM: U21.868  ICD-9-CM: 347.00   Yes        Atrial septal defect ICD-10-CM: Q21.1  ICD-9-CM: 745.5   Yes         Post-Op day 0, stable    Plan:   1. Routine post-operative care   2. The risks and benefits of the circumcision  procedure and anesthesia including: bleeding, infection, variability of cosmetic results were discussed at length with the mother. She is aware that future repeat procedures may be necessary. She gives informed consent to proceed as noted and her questions are answered. Information for the patient's :  Bibiana Motley [[id number]]   , Low Transverse  Patient doing well without significant complaint. Nausea and vomiting resolved, tolerating liquids, no flatus, rose in place. She feels well, just tired.     Current Facility-Administered Medications   Medication Dose Route Frequency    sodium chloride (NS) flush 5-40 mL  5-40 mL IntraVENous Q8H    lactated Ringers infusion  125 mL/hr IntraVENous CONTINUOUS    ketorolac (TORADOL) injection 30 mg  30 mg IntraVENous Q6H    sertraline (ZOLOFT) tablet 25 mg  25 mg Oral DAILY        Vitals:  Visit Vitals  BP 98/65 (BP 1 Location: Left upper arm, BP Patient Position: Lying)   Pulse 83   Temp 98.2 °F (36.8 °C)   Resp 20   Ht 5' 2\" (1.575 m)   Wt 110.7 kg (244 lb)   SpO2 98%   Breastfeeding Unknown   BMI 44.63 kg/m²     Temp (24hrs), Av.2 °F (36.8 °C), Min:97.9 °F (36.6 °C), Max:98.4 °F (36.9 °C)      Last 24hr Input/Output:    Intake/Output Summary (Last 24 hours) at 9/2/2022 0921  Last data filed at 9/2/2022 0658  Gross per 24 hour   Intake 200 ml   Output 1070 ml   Net -870 ml          Exam:        Patient without distress. Lungs clear. Abdomen, bowel sounds present, soft, expected tenderness, fundus firm ,incision intact     Perineum normal lochia noted               Lower extremities are negative for swelling, cords or tenderness. Labs:   Lab Results   Component Value Date/Time    WBC 11.0 09/01/2022 11:37 AM    HGB 10.7 (L) 09/01/2022 11:37 AM    HCT 32.2 (L) 09/01/2022 11:37 AM    PLATELET 225 24/66/5641 11:37 AM       Recent Results (from the past 24 hour(s))   CBC WITH AUTOMATED DIFF    Collection Time: 09/01/22 11:37 AM   Result Value Ref Range    WBC 11.0 4.6 - 13.2 K/uL    RBC 3.83 (L) 4.20 - 5.30 M/uL    HGB 10.7 (L) 12.0 - 16.0 g/dL    HCT 32.2 (L) 35.0 - 45.0 %    MCV 84.1 78.0 - 100.0 FL    MCH 27.9 24.0 - 34.0 PG    MCHC 33.2 31.0 - 37.0 g/dL    RDW 13.6 11.6 - 14.5 %    PLATELET 792 942 - 469 K/uL    MPV 11.2 9.2 - 11.8 FL    NRBC 0.0 0  WBC    ABSOLUTE NRBC 0.00 0.00 - 0.01 K/uL    NEUTROPHILS 68 40 - 73 %    LYMPHOCYTES 21 21 - 52 %    MONOCYTES 6 3 - 10 %    EOSINOPHILS 3 0 - 5 %    BASOPHILS 1 0 - 2 %    IMMATURE GRANULOCYTES 2 (H) 0.0 - 0.5 %    ABS. NEUTROPHILS 7.4 1.8 - 8.0 K/UL    ABS. LYMPHOCYTES 2.3 0.9 - 3.6 K/UL    ABS. MONOCYTES 0.6 0.05 - 1.2 K/UL    ABS. EOSINOPHILS 0.3 0.0 - 0.4 K/UL    ABS. BASOPHILS 0.1 0.0 - 0.1 K/UL    ABS. IMM.  GRANS. 0.2 (H) 0.00 - 0.04 K/UL    DF AUTOMATED     TYPE & SCREEN    Collection Time: 09/01/22 11:37 AM   Result Value Ref Range    Crossmatch Expiration 09/13/2022,2359     ABO/Rh(D) B POSITIVE     Antibody screen NEG

## 2022-09-02 NOTE — PROGRESS NOTES
1915 Bedside and Verbal shift change report given to CARMEN Reza RN (oncoming nurse) by Monique Bishop RN (offgoing nurse). Report included the following information SBAR, Kardex, Intake/Output, MAR, and Recent Results. 1945 IV removed due to infiltration. Patient going to bathroom to try and void. 2140 Shift assessment completed by orientee. 2100 Patient called stating her incision felt wet and cold. Incision clean dry and intact. Dry blood on left side of incision. 0010 Patient given nipple cream.    0300 Orientee checked on patient. Nothing to report. 7695 Bedside and Verbal shift change report given to Wesley Her (oncoming nurse) by Cesar Hermosillo RN and [employer] RN (offgoing nurse). Report included the following information SBAR, Kardex, Procedure Summary, Intake/Output, MAR, and Recent Results.

## 2022-09-03 VITALS
WEIGHT: 244 LBS | HEIGHT: 62 IN | BODY MASS INDEX: 44.9 KG/M2 | TEMPERATURE: 97.7 F | DIASTOLIC BLOOD PRESSURE: 54 MMHG | SYSTOLIC BLOOD PRESSURE: 114 MMHG | RESPIRATION RATE: 18 BRPM | OXYGEN SATURATION: 99 % | HEART RATE: 76 BPM

## 2022-09-03 LAB
HCT VFR BLD AUTO: 28.5 % (ref 35–45)
HGB BLD-MCNC: 9.3 G/DL (ref 12–16)

## 2022-09-03 PROCEDURE — 90707 MMR VACCINE SC: CPT | Performed by: ADVANCED PRACTICE MIDWIFE

## 2022-09-03 PROCEDURE — 85018 HEMOGLOBIN: CPT

## 2022-09-03 PROCEDURE — 74011250637 HC RX REV CODE- 250/637

## 2022-09-03 PROCEDURE — 74011250637 HC RX REV CODE- 250/637: Performed by: OBSTETRICS & GYNECOLOGY

## 2022-09-03 PROCEDURE — 74011250636 HC RX REV CODE- 250/636: Performed by: ADVANCED PRACTICE MIDWIFE

## 2022-09-03 PROCEDURE — 36415 COLL VENOUS BLD VENIPUNCTURE: CPT

## 2022-09-03 RX ORDER — LANOLIN ALCOHOL/MO/W.PET/CERES
1 CREAM (GRAM) TOPICAL 2 TIMES DAILY WITH MEALS
Status: DISCONTINUED | OUTPATIENT
Start: 2022-09-03 | End: 2022-09-04 | Stop reason: HOSPADM

## 2022-09-03 RX ORDER — OXYCODONE AND ACETAMINOPHEN 5; 325 MG/1; MG/1
1-2 TABLET ORAL
Qty: 14 TABLET | Refills: 0 | Status: SHIPPED | OUTPATIENT
Start: 2022-09-03 | End: 2022-09-03

## 2022-09-03 RX ORDER — IBUPROFEN 200 MG
800 CAPSULE ORAL EVERY 8 HOURS
Qty: 90 CAPSULE | Refills: 0 | Status: SHIPPED | OUTPATIENT
Start: 2022-09-03

## 2022-09-03 RX ADMIN — IBUPROFEN 800 MG: 400 TABLET, FILM COATED ORAL at 09:19

## 2022-09-03 RX ADMIN — IBUPROFEN 800 MG: 400 TABLET, FILM COATED ORAL at 00:41

## 2022-09-03 RX ADMIN — FERROUS SULFATE TAB 325 MG (65 MG ELEMENTAL FE) 325 MG: 325 (65 FE) TAB at 13:54

## 2022-09-03 RX ADMIN — FERROUS SULFATE TAB 325 MG (65 MG ELEMENTAL FE) 325 MG: 325 (65 FE) TAB at 17:48

## 2022-09-03 RX ADMIN — MEASLES, MUMPS, AND RUBELLA VIRUS VACCINE LIVE 0.5 ML: 1000; 12500; 1000 INJECTION, POWDER, LYOPHILIZED, FOR SUSPENSION SUBCUTANEOUS at 17:48

## 2022-09-03 NOTE — PROGRESS NOTES
Problem:  Delivery: Postpartum Day 1  Goal: Activity/Safety  Outcome: Progressing Towards Goal  Goal: Nutrition/Diet  Outcome: Progressing Towards Goal  Goal: Medications  Outcome: Progressing Towards Goal  Goal: Respiratory  Outcome: Progressing Towards Goal  Goal: Treatments/Interventions/Procedures  Outcome: Progressing Towards Goal  Goal: Psychosocial  Outcome: Progressing Towards Goal  Goal: *Vital signs within defined limits  Outcome: Progressing Towards Goal  Goal: *Labs within defined limits  Outcome: Progressing Towards Goal  Goal: *Hemodynamically stable  Outcome: Progressing Towards Goal  Goal: *Optimal pain control at patient's stated goal  Outcome: Progressing Towards Goal  Goal: *Participates in infant care  Outcome: Progressing Towards Goal  Goal: *Demonstrates progressive activity  Outcome: Progressing Towards Goal  Goal: *Performs self perineal care  Outcome: Progressing Towards Goal     Problem: Pain  Goal: *Control of Pain  Outcome: Progressing Towards Goal

## 2022-09-03 NOTE — PROGRESS NOTES
CM consult noted. CM contacted pt to discuss concerns. Pt has indicated she has not used opioids since 2015, cannabis since she has been pregnant and tobacco since 2018. Pt lives with her fiance and he will be assisting if she has a narcoleptic episode. Pt feels her depression is fine and has not concerns and has support transitioning home with the new baby boy. CM notified pt's bedside nurse of findings.

## 2022-09-03 NOTE — PROGRESS NOTES
Name:  Tom Carvajal  Age:  32 y.o. MRN:  [id number]  CSN:  [id number]  :  1995    9/3/2022  8:36 AM    Anesthesia Intrathecal Duramorph Rounds Note    Referring physician: Rosi Espinoza, *   Patient status post Procedure(s):   SECTION on 2022    POST OP Day # 1    Visit Vitals  BP (!) 110/50 (BP 1 Location: Right upper arm, BP Patient Position: Sitting)   Pulse 72   Temp 36.6 °C (97.8 °F)   Resp 18   Ht 5' 2\" (1.575 m)   Wt 110.7 kg (244 lb)   SpO2 100%   Breastfeeding Unknown   BMI 44.63 kg/m²          Patient rates pain 1 at rest and 2 with movement. Pain is subjectively rated by patient as mild. Pain location: abdomen    No complications noted, adequate analgesia from Intrathecal Duramorph. Continue current postop pain regimen.     Glenn Sanchez MD

## 2022-09-03 NOTE — DISCHARGE INSTRUCTIONS
POST DELIVERY DISCHARGE INSTRUCTIONS    Name: Roopa Smith  YOB: 1995  Primary Diagnosis: Principal Problem:    Post term pregnancy over 40 weeks (2022)    Active Problems:    Excessive fetal growth affecting management of pregnancy in third trimester (2022)      Anemia during pregnancy in third trimester (2022)      Narcolepsy (2005)      Atrial septal defect ()      S/P  section (2022)        General:     Diet/Diet Restrictions:  Eight 8-ounce glasses of fluid daily (water, juices); avoid excessive caffeine intake. Meals/snacks as desired which are high in fiber and carbohydrates and low in fat and cholesterol. Physical Activity / Restrictions / Safety:     Avoid heavy lifting, no more that 8 lbs. For 2-3 weeks; limit use of stairs to 2 times daily for the first week home. No driving for one week. Avoid intercourse 4-6 weeks, no douching or tampon use. Check with obstetrician before starting or resuming an exercise program.         Discharge Instructions/Special Treatment/Home Care Needs:     Continue prenatal vitamins. Continue to use squirt bottle with warm water on your episiotomy after each bathroom use until bleeding stops. If steri-strips applied to your incision, remove in 7-10 days. Call your doctor for the following:     Fever over 101 degrees by mouth. Vaginal bleeding heavier than a normal menstrual period or clot larger than a golf ball. Red streaks or increased swelling of legs, painful red streaks on your breast.  Painful urination, constipation and increased pain or swelling or discharge with your incision. If you feel extremely anxious or overwhelmed. If you have thoughts of harming yourself and/or your baby. Pain Management:     Pain Management:   Take Acetaminophen (Tylenol) or Ibuprofen (Advil, Motrin), as directed for pain. Use a warm Sitz bath 3 times daily to relieve episiotomy or hemorrhoidal discomfort.  Heating pad to  incision as needed. For hemorrhoidal discomfort, use Tucks and Anusol cream as needed and directed. Follow-Up Care: These are general instructions for a healthy lifestyle:    No smoking/ No tobacco products/ Avoid exposure to second hand smoke    Surgeon General's Warning:  Quitting smoking now greatly reduces serious risk to your health. Obesity, smoking, and sedentary lifestyle greatly increases your risk for illness    A healthy diet, regular physical exercise & weight monitoring are important for maintaining a healthy lifestyle    Recognize signs and symptoms of STROKE:    F-face looks uneven    A-arms unable to move or move unevenly    S-speech slurred or non-existent    T-time-call 911 as soon as signs and symptoms begin-DO NOT go       Back to bed or wait to see if you get better-TIME IS BRAIN. Depression After Childbirth: Care Instructions  Overview     Many women get the \"baby blues\" during the first few days after childbirth. You may lose sleep, feel irritable, and cry easily. You may feel happy one minute and sad the next. Hormone changes are one cause of these emotional changes. Also, the demands of a new baby, along with visits from relatives or other family needs, can add to the stress. The \"baby blues\" often peak around the fourth day. Then they ease up in less than 2 weeks. If your moodiness or anxiety lasts for more than 2 weeks, or if you feel like life is not worth living, you may have postpartum depression. This is different for each person. Some mothers with serious depression may worry intensely about their infant's well-being. Others may feel distant from their child. Some mothers may even feel that they might harm their baby. Some may have signs of paranoia, wondering if someone is watching them. Depression is not a sign of weakness. It's a medical condition that requires treatment. Medicine and counseling often work well to reduce depression.  Talk to your doctor about taking antidepressant medicine while breastfeeding. Follow-up care is a key part of your treatment and safety. Be sure to make and go to all appointments, and call your doctor if you are having problems. It's also a good idea to know your test results and keep a list of the medicines you take. How do you know if you are depressed? With all the changes in your life, you may not know if you are depressed. Pregnancy sometimes causes changes in how you feel that are similar to the symptoms of depression. Symptoms of depression include:  Feeling sad or hopeless and losing interest in daily activities. These are the most common symptoms of depression. Sleeping too much or not enough. Feeling tired. You may feel as if you have no energy. Eating too much or too little. Writing or talking about death, such as writing suicide notes or talking about guns, knives, or pills. Keep the numbers for these national suicide hotlines: 1-777-704-TALK (0-935.293.5598) and 6-036-EYHLTSE (8-462.875.5833). If you or someone you know talks about suicide or feeling hopeless, get help right away. How can you care for yourself at home? Be safe with medicines. Take your medicines exactly as prescribed. Call your doctor if you think you are having a problem with your medicine. Eat a healthy diet so that you can keep up your energy. Get regular daily exercise, such as walks, to help improve your mood. Get as much sunlight as possible. Keep your shades and curtains open. Get outside as much as you can. Avoid using alcohol or other substances to feel better. Get as much rest and sleep as possible. Avoid doing too much. Being too tired can increase depression. Play stimulating music throughout your day and soothing music at night. Schedule outings and visits with friends and family. Ask them to call you regularly, so that you don't feel alone. Ask for help with preparing food and other daily tasks.  Family and friends are often happy to help with a . Be honest with yourself and those who care about you. Tell them about your struggle. Join a support group of new mothers. No one can better understand the challenges of caring for a  than other new mothers. If you feel like life is not worth living or you're feeling hopeless, get help right away. Keep the numbers for these national suicide hotlines: 8-172-874-TALK (3-483.477.6938) and 0-666-FWVNWAR (9-743.845.7894). When should you call for help? Call 911 anytime you think you may need emergency care. For example, call if:    You feel you cannot stop from hurting yourself, your baby, or someone else. Call your doctor now or seek immediate medical care if:    You are having trouble caring for yourself or your baby. You hear voices. Watch closely for changes in your health, and be sure to contact your doctor if:    You have problems with your depression medicine. You do not get better as expected. Where can you learn more? Go to http://www.gray.com/  Enter [id number] in the search box to learn more about \"Depression After Childbirth: Care Instructions. \"  Current as of: 2021               Content Version: 13.2   [employer]. Care instructions adapted under license by [employer] (which disclaims liability or warranty for this information). If you have questions about a medical condition or this instruction, always ask your healthcare professional. [place] [address] any warranty or liability for your use of this information.

## 2022-09-03 NOTE — DISCHARGE SUMMARY
Obstetrical Discharge Summary     Name: Ld Broussard MRN: [id number]  SSN: [id number]    YOB: 1995  Age: 32 y.o. Sex: female      Admit Date: 2022    Discharge Date: 9/3/2022     Admitting Physician: Nidia Holguin MD     Attending Physician:  Megan Murrell MD     Admission Diagnoses: Post term pregnancy over 40 weeks [O48.0]    Discharge Diagnoses:   Information for the patient's :  Courtney Hsu [[id number]]   Delivery of a 4.965 kg male infant via , Low Transverse on 2022 at 4:16 AM  by Vale Ballesteros. Apgars were 8  and 9 . Additional Diagnoses:   Hospital Problems  Date Reviewed: 2022            Codes Class Noted POA    Excessive fetal growth affecting management of pregnancy in third trimester ICD-10-CM: O36.63X0  ICD-9-CM: 656.63  2022 Yes        Anemia during pregnancy in third trimester ICD-10-CM: O99.013  ICD-9-CM: 648.23  2022 Yes        S/P  section ICD-10-CM: D45.493  ICD-9-CM: V45.89  2022 No        * (Principal) Post term pregnancy over 40 weeks ICD-10-CM: O48.0  ICD-9-CM: 645.10  2022 Yes        Narcolepsy ICD-10-CM: B42.502  ICD-9-CM: 347.00   Yes        Atrial septal defect ICD-10-CM: Q21.1  ICD-9-CM: 745.5   Yes        No results found for: RUBELLAEXT, GRBSEXT    Immunization(s): There is no immunization history for the selected administration types on file for this patient. Hospital Course: Normal hospital course following the delivery. Patient Instructions:   Current Discharge Medication List        START taking these medications    Details   ibuprofen 200 mg cap     Take 800 mg by mouth every eight (8) hours. Qty: 90 Capsule, Refills: 0               CONTINUE these medications which have NOT CHANGED    Details   PNV Comb #2-Iron-FA-Omega 3 29-1-400 mg cmpk Take  by mouth. Iron BisGl &PS Fbc-N-C34-FA-Ca 897-27-67-3 mg-mg-mcg-mg cap Take  by mouth.       sertraline (ZOLOFT) 25 mg tablet Take  by mouth daily. STOP taking these medications       famotidine (PEPCID) 20 mg tablet Comments:   Reason for Stopping:               Reference my discharge instructions. Follow-up Appointments   Procedures    FOLLOW UP VISIT Appointment in: 6 Weeks Please call OB office to schedule a 6 week postpartum follow up appointment. Please call OB office to schedule a 6 week postpartum follow up appointment.      Standing Status:   Standing     Number of Occurrences:   1     Order Specific Question:   Appointment in     Answer:   6 Weeks        Signed By:  [address] CNM     September 3, 2022

## 2022-09-03 NOTE — PROGRESS NOTES
Problem:  Delivery: Postpartum Day 1  Goal: Activity/Safety  Outcome: Progressing Towards Goal  Goal: Consults, if ordered  Outcome: Progressing Towards Goal  Goal: Diagnostic Test/Procedures  Outcome: Progressing Towards Goal  Goal: Nutrition/Diet  Outcome: Progressing Towards Goal  Goal: Discharge Planning  Outcome: Progressing Towards Goal  Goal: Medications  Outcome: Progressing Towards Goal  Goal: Respiratory  Outcome: Progressing Towards Goal  Goal: Treatments/Interventions/Procedures  Outcome: Progressing Towards Goal  Goal: Psychosocial  Outcome: Progressing Towards Goal  Goal: *Vital signs within defined limits  Outcome: Progressing Towards Goal  Goal: *Labs within defined limits  Outcome: Progressing Towards Goal  Goal: *Hemodynamically stable  Outcome: Progressing Towards Goal  Goal: *Optimal pain control at patient's stated goal  Outcome: Progressing Towards Goal  Goal: *Participates in infant care  Outcome: Progressing Towards Goal  Goal: *Demonstrates progressive activity  Outcome: Progressing Towards Goal  Goal: *Performs self perineal care  Outcome: Progressing Towards Goal

## 2022-09-03 NOTE — PROGRESS NOTES
Progress Note    Patient: Matilde Cleaning MRN: [id number]  SSN: [id number]    YOB: 1995  Age: 32 y.o. Sex: female      Subjective:     Post-op Day: 1  Delivery    The patient is without complaints. The patient is ambulating well. The patient is tolerating a normal diet. The baby is well. Objective:      Patient Vitals for the past 8 hrs:   BP Temp Pulse Resp SpO2   22 1139 114/61 97.8 °F (36.6 °C) 79 18 100 %   22 0806 (!) 110/50 97.8 °F (36.6 °C) 72 18 100 %     LABS: Recent Results (from the past 24 hour(s))   HGB & HCT    Collection Time: 22  4:35 AM   Result Value Ref Range    HGB 9.3 (L) 12.0 - 16.0 g/dL    HCT 28.5 (L) 35.0 - 45.0 %       Lab Results   Component Value Date/Time    HGB 9.3 (L) 2022 04:35 AM     Lab Results   Component Value Date/Time    HCT 28.5 (L) 2022 04:35 AM      Lochia:  appropriate   Uterine Fundus:   firm, -1     Lab/Data Review: All lab results for the last 24 hours reviewed. Assessment:     Status post: Doing well postpartum vaginal delivery day 1. Plan:     Continue day 1 post- delivery orders. Postpartum care discussed including diet, ambulation, and actvitiy restrictions. Pt requesting to go home today. Reviewed dc instructions, follow up and answered all questions.      Signed By: Enrique Hollingsworth CNM     September 3, 2022

## 2022-09-03 NOTE — PROGRESS NOTES
Bedside and Verbal shift change report given to Dustin (oncoming nurse) by Yari Kelley and Frandy (offgoing nurse). Report included the following information SBAR, Intake/Output, MAR, and Recent Results. 5747- shift assessment complete. Pt medicated per MAR    1020- pt resting in bed    1208- pt resting in bed    1300- pt educated on infant being taken for circumcision    1411- Circumcision care reviewed with parents. Parents instructed to call for bleeding greater than the size of a quarter, blood soaking in the diaper, to use vaseline with every diaper change, and not to use wipes on tip of penis. Parents verbalized and acknowledged understanding, questions answered, no other concerns at this time. 1554- reassessment complete. Pt assisted with breastfeeding    1748- pt medicated per MAR. I have reviewed discharge instructions with the patient. The patient verbalized understanding. 1830- AVS given and reviewed. Bands verified. Patient armband removed and shredded. All questions answered. [address] aware of EPDS of 12.

## 2023-11-28 ENCOUNTER — HOSPITAL ENCOUNTER (EMERGENCY)
Facility: HOSPITAL | Age: 28
Discharge: HOME | End: 2023-11-28
Payer: MEDICAID

## 2023-11-28 ENCOUNTER — APPOINTMENT (OUTPATIENT)
Facility: HOSPITAL | Age: 28
End: 2023-11-28
Payer: MEDICAID

## 2023-11-28 VITALS
BODY MASS INDEX: 40.48 KG/M2 | HEIGHT: 62 IN | HEART RATE: 75 BPM | SYSTOLIC BLOOD PRESSURE: 107 MMHG | WEIGHT: 220 LBS | OXYGEN SATURATION: 98 % | RESPIRATION RATE: 14 BRPM | DIASTOLIC BLOOD PRESSURE: 61 MMHG | TEMPERATURE: 97.1 F

## 2023-11-28 DIAGNOSIS — R10.31 ABDOMINAL PAIN, RIGHT LOWER QUADRANT: ICD-10-CM

## 2023-11-28 DIAGNOSIS — B96.89 BV (BACTERIAL VAGINOSIS): ICD-10-CM

## 2023-11-28 DIAGNOSIS — N20.0 KIDNEY STONE: ICD-10-CM

## 2023-11-28 DIAGNOSIS — R10.2 PELVIC PAIN: Primary | ICD-10-CM

## 2023-11-28 DIAGNOSIS — N83.202 CYST OF LEFT OVARY: ICD-10-CM

## 2023-11-28 DIAGNOSIS — N76.0 BV (BACTERIAL VAGINOSIS): ICD-10-CM

## 2023-11-28 LAB
ALBUMIN SERPL-MCNC: 3.7 G/DL (ref 3.4–5)
ALBUMIN/GLOB SERPL: 1.2 (ref 0.8–1.7)
ALP SERPL-CCNC: 74 U/L (ref 45–117)
ALT SERPL-CCNC: 52 U/L (ref 13–56)
ANION GAP SERPL CALC-SCNC: 6 MMOL/L (ref 3–18)
APPEARANCE UR: CLEAR
AST SERPL-CCNC: 27 U/L (ref 10–38)
BACTERIA URNS QL MICRO: NEGATIVE /HPF
BASOPHILS # BLD: 0.1 K/UL (ref 0–0.1)
BASOPHILS NFR BLD: 1 % (ref 0–2)
BILIRUB SERPL-MCNC: 0.3 MG/DL (ref 0.2–1)
BILIRUB UR QL: NEGATIVE
BUN SERPL-MCNC: 14 MG/DL (ref 7–18)
BUN/CREAT SERPL: 21 (ref 12–20)
CALCIUM SERPL-MCNC: 8.6 MG/DL (ref 8.5–10.1)
CHLORIDE SERPL-SCNC: 108 MMOL/L (ref 100–111)
CO2 SERPL-SCNC: 27 MMOL/L (ref 21–32)
COLOR UR: YELLOW
CREAT SERPL-MCNC: 0.66 MG/DL (ref 0.6–1.3)
DIFFERENTIAL METHOD BLD: ABNORMAL
EOSINOPHIL # BLD: 0.3 K/UL (ref 0–0.4)
EOSINOPHIL NFR BLD: 4 % (ref 0–5)
EPITH CASTS URNS QL MICRO: NORMAL /LPF (ref 0–5)
ERYTHROCYTE [DISTWIDTH] IN BLOOD BY AUTOMATED COUNT: 12.3 % (ref 11.6–14.5)
GLOBULIN SER CALC-MCNC: 3.2 G/DL (ref 2–4)
GLUCOSE SERPL-MCNC: 93 MG/DL (ref 74–99)
GLUCOSE UR STRIP.AUTO-MCNC: NEGATIVE MG/DL
HCG UR QL: NEGATIVE
HCT VFR BLD AUTO: 39.7 % (ref 35–45)
HGB BLD-MCNC: 13.6 G/DL (ref 12–16)
HGB UR QL STRIP: NEGATIVE
IMM GRANULOCYTES # BLD AUTO: 0 K/UL (ref 0–0.04)
IMM GRANULOCYTES NFR BLD AUTO: 1 % (ref 0–0.5)
KETONES UR QL STRIP.AUTO: NEGATIVE MG/DL
LEUKOCYTE ESTERASE UR QL STRIP.AUTO: ABNORMAL
LIPASE SERPL-CCNC: 43 U/L (ref 13–75)
LYMPHOCYTES # BLD: 2.5 K/UL (ref 0.9–3.6)
LYMPHOCYTES NFR BLD: 32 % (ref 21–52)
MCH RBC QN AUTO: 29.6 PG (ref 24–34)
MCHC RBC AUTO-ENTMCNC: 34.3 G/DL (ref 31–37)
MCV RBC AUTO: 86.3 FL (ref 78–100)
MONOCYTES # BLD: 0.5 K/UL (ref 0.05–1.2)
MONOCYTES NFR BLD: 6 % (ref 3–10)
NEUTS SEG # BLD: 4.4 K/UL (ref 1.8–8)
NEUTS SEG NFR BLD: 56 % (ref 40–73)
NITRITE UR QL STRIP.AUTO: NEGATIVE
NRBC # BLD: 0 K/UL (ref 0–0.01)
NRBC BLD-RTO: 0 PER 100 WBC
PH UR STRIP: 6.5 (ref 5–8)
PLATELET # BLD AUTO: 300 K/UL (ref 135–420)
PMV BLD AUTO: 10.1 FL (ref 9.2–11.8)
POTASSIUM SERPL-SCNC: 4 MMOL/L (ref 3.5–5.5)
PROT SERPL-MCNC: 6.9 G/DL (ref 6.4–8.2)
PROT UR STRIP-MCNC: NEGATIVE MG/DL
RBC # BLD AUTO: 4.6 M/UL (ref 4.2–5.3)
RBC #/AREA URNS HPF: NEGATIVE /HPF (ref 0–5)
SERVICE CMNT-IMP: NORMAL
SODIUM SERPL-SCNC: 141 MMOL/L (ref 136–145)
SP GR UR REFRACTOMETRY: 1.02 (ref 1–1.03)
UROBILINOGEN UR QL STRIP.AUTO: 0.2 EU/DL (ref 0.2–1)
WBC # BLD AUTO: 7.8 K/UL (ref 4.6–13.2)
WBC URNS QL MICRO: NORMAL /HPF (ref 0–5)
WET PREP GENITAL: NORMAL

## 2023-11-28 PROCEDURE — 2580000003 HC RX 258: Performed by: NURSE PRACTITIONER

## 2023-11-28 PROCEDURE — 6360000002 HC RX W HCPCS: Performed by: NURSE PRACTITIONER

## 2023-11-28 PROCEDURE — 96374 THER/PROPH/DIAG INJ IV PUSH: CPT

## 2023-11-28 PROCEDURE — 87591 N.GONORRHOEAE DNA AMP PROB: CPT

## 2023-11-28 PROCEDURE — 87661 TRICHOMONAS VAGINALIS AMPLIF: CPT

## 2023-11-28 PROCEDURE — 6360000004 HC RX CONTRAST MEDICATION: Performed by: NURSE PRACTITIONER

## 2023-11-28 PROCEDURE — 81025 URINE PREGNANCY TEST: CPT

## 2023-11-28 PROCEDURE — 85025 COMPLETE CBC W/AUTO DIFF WBC: CPT

## 2023-11-28 PROCEDURE — 74177 CT ABD & PELVIS W/CONTRAST: CPT

## 2023-11-28 PROCEDURE — 87210 SMEAR WET MOUNT SALINE/INK: CPT

## 2023-11-28 PROCEDURE — 80053 COMPREHEN METABOLIC PANEL: CPT

## 2023-11-28 PROCEDURE — 99285 EMERGENCY DEPT VISIT HI MDM: CPT

## 2023-11-28 PROCEDURE — 87491 CHLMYD TRACH DNA AMP PROBE: CPT

## 2023-11-28 PROCEDURE — 81001 URINALYSIS AUTO W/SCOPE: CPT

## 2023-11-28 PROCEDURE — 76830 TRANSVAGINAL US NON-OB: CPT

## 2023-11-28 PROCEDURE — 83690 ASSAY OF LIPASE: CPT

## 2023-11-28 RX ORDER — METRONIDAZOLE 7.5 MG/G
1 GEL VAGINAL NIGHTLY
Qty: 1 EACH | Refills: 0 | Status: SHIPPED | OUTPATIENT
Start: 2023-11-28 | End: 2023-12-03

## 2023-11-28 RX ORDER — 0.9 % SODIUM CHLORIDE 0.9 %
1000 INTRAVENOUS SOLUTION INTRAVENOUS ONCE
Status: COMPLETED | OUTPATIENT
Start: 2023-11-28 | End: 2023-11-28

## 2023-11-28 RX ORDER — KETOROLAC TROMETHAMINE 15 MG/ML
15 INJECTION, SOLUTION INTRAMUSCULAR; INTRAVENOUS
Status: COMPLETED | OUTPATIENT
Start: 2023-11-28 | End: 2023-11-28

## 2023-11-28 RX ADMIN — SODIUM CHLORIDE 1000 ML: 9 INJECTION, SOLUTION INTRAVENOUS at 11:01

## 2023-11-28 RX ADMIN — KETOROLAC TROMETHAMINE 15 MG: 15 INJECTION, SOLUTION INTRAMUSCULAR; INTRAVENOUS at 11:01

## 2023-11-28 RX ADMIN — IOPAMIDOL 100 ML: 612 INJECTION, SOLUTION INTRAVENOUS at 14:31

## 2023-11-28 NOTE — ED NOTES
Patient given discharge papers. Patient understanding of discharge.  Patient ambulatory out of ED       Eric Lopez RN  11/28/23 [id number]

## 2023-11-28 NOTE — ED NOTES
Verbal shift change report given to Christina Alfredo RN (oncoming nurse) by Dagmar Beaver RN (offgoing nurse). Report included the following information Nurse Handoff Report, Index, ED Encounter Summary, MAR, and Recent Results.         Rui Tinajero RN  11/28/23 [id number]

## 2023-11-28 NOTE — ED PROVIDER NOTES
[facility] EMERGENCY DEPT  EMERGENCY DEPARTMENT ENCOUNTER       Pt Name: Torrey Reddy  MRN: [id number]  [address] [place] 1995  Date of evaluation: 11/28/2023  PCP: No primary care provider on file. Note Started: 5:10 PM 11/28/23     CHIEF COMPLAINT       Chief Complaint   Patient presents with    Pelvic Pain        HISTORY OF PRESENT ILLNESS: 1 or more elements      History From: Patient  HPI Limitations: None  Chronic Conditions: PCOS, anemia, narcotic abuse in remission, epilepsy, narcolepsy, depression  Social Determinants affecting Dx or Tx: None     Tamar Sandoval is a 29 y.o. female with history of PCOS, anemia, narcotic abuse in remission, epilepsy, narcolepsy, depressionwho presents to ED c/o right-sided pelvic pain. Patient reports she does occasionally have pain from cyst/ruptured cyst due to PCOS, pain began this morning as dull but intensified throughout the morning. Patient reports pain from) is usually brief and this pain is going away. Pain is intermittent, described as \"coming in waves\" and \"pulling\". Patient denies fever/chills, does report mild headache, no nausea vomiting, diarrhea, constipation, dysuria, urinary frequency or urgency, focal weakness or paresthesias, hematuria, lightheadedness, vaginal bleeding. Patient is unsure of LMP as she has Nexplanon and does not regularly menstruate. Patient denies noted vaginal discharge but does report some vaginal discomfort. Nursing Notes were all reviewed and agreed with or any disagreements were addressed in the HPI.     PAST HISTORY     Past Medical History:  Past Medical History:   Diagnosis Date    Anemia     Atrial septal defect 2004    Epilepsy [facility])     Major depressive disorder 2021    Narcolepsy 2005    Narcotic abuse in remission [facility])     Polycystic disease, ovaries        Past Surgical History:  Past Surgical History:   Procedure Laterality Date    TYMPANOSTOMY TUBE PLACEMENT  2008    VSD REPAIR  2005       Family History:  Family

## 2023-11-29 LAB
C TRACH RRNA SPEC QL NAA+PROBE: NEGATIVE
N GONORRHOEA RRNA SPEC QL NAA+PROBE: NEGATIVE
SPECIMEN SOURCE: NORMAL
T VAGINALIS RRNA SPEC QL NAA+PROBE: NEGATIVE

## (undated) DEVICE — SOL IRRIGATION INJ NACL 0.9% 500ML BTL

## (undated) DEVICE — GARMENT,MEDLINE,DVT,INT,CALF,MED, GEN2: Brand: MEDLINE

## (undated) DEVICE — LARGE, DISPOSABLE ALEXIS O C-SECTION PROTECTOR - RETRACTOR: Brand: ALEXIS ® O C-SECTION PROTECTOR - RETRACTOR

## (undated) DEVICE — BSMI CSECTION BIRTHING PACK: Brand: MEDLINE INDUSTRIES, INC.

## (undated) DEVICE — SUTURE VCRL SZ 1 L36IN ABSRB UD L36MM CT-1 1/2 CIR J947H

## (undated) DEVICE — DERMABOND SKIN ADH 0.7ML --

## (undated) DEVICE — HEX-LOCKING BLADE ELECTRODE: Brand: EDGE

## (undated) DEVICE — SUT VCRL + 2-0 36IN CT1 UD --

## (undated) DEVICE — REM POLYHESIVE ADULT PATIENT RETURN ELECTRODE: Brand: VALLEYLAB

## (undated) DEVICE — LINER,SEMI-RIGID,3000CC,50EA/CS: Brand: MEDLINE

## (undated) DEVICE — STAPLER SKIN SQ 30 ABSRB STPL DISP INSORB

## (undated) DEVICE — INTENDED FOR TISSUE SEPARATION, AND OTHER PROCEDURES THAT REQUIRE A SHARP SURGICAL BLADE TO PUNCTURE OR CUT.: Brand: BARD-PARKER ® CARBON RIB-BACK BLADES

## (undated) DEVICE — STRAP,POSITIONING,KNEE/BODY,FOAM,4X60": Brand: MEDLINE